# Patient Record
Sex: FEMALE | Race: BLACK OR AFRICAN AMERICAN | Employment: OTHER | URBAN - METROPOLITAN AREA
[De-identification: names, ages, dates, MRNs, and addresses within clinical notes are randomized per-mention and may not be internally consistent; named-entity substitution may affect disease eponyms.]

---

## 2018-05-04 ENCOUNTER — TELEPHONE (OUTPATIENT)
Dept: ENDOCRINOLOGY | Age: 73
End: 2018-05-04

## 2021-04-02 ENCOUNTER — OFFICE VISIT (OUTPATIENT)
Dept: PRIMARY CARE CLINIC | Age: 76
End: 2021-04-02
Payer: MEDICARE

## 2021-04-02 DIAGNOSIS — Z20.828 EXPOSURE TO SARS-ASSOCIATED CORONAVIRUS: Primary | ICD-10-CM

## 2021-04-02 LAB — SARS-COV-2: NOT DETECTED

## 2021-04-02 PROCEDURE — 99211 OFF/OP EST MAY X REQ PHY/QHP: CPT | Performed by: NURSE PRACTITIONER

## 2021-04-02 PROCEDURE — G8421 BMI NOT CALCULATED: HCPCS | Performed by: NURSE PRACTITIONER

## 2021-04-02 PROCEDURE — G8428 CUR MEDS NOT DOCUMENT: HCPCS | Performed by: NURSE PRACTITIONER

## 2021-04-02 RX ORDER — ASPIRIN 81 MG/1
81 TABLET ORAL DAILY
COMMUNITY

## 2021-04-02 RX ORDER — MAGNESIUM GLUCONATE 27 MG(500)
500 TABLET ORAL DAILY
COMMUNITY

## 2021-04-02 RX ORDER — GLIMEPIRIDE 2 MG/1
2 TABLET ORAL
COMMUNITY

## 2021-04-02 RX ORDER — ATORVASTATIN CALCIUM 20 MG/1
20 TABLET, FILM COATED ORAL DAILY
COMMUNITY

## 2021-04-02 RX ORDER — M-VIT,TX,IRON,MINS/CALC/FOLIC 27MG-0.4MG
1 TABLET ORAL DAILY
COMMUNITY

## 2021-04-02 RX ORDER — CHLORAL HYDRATE 500 MG
1000 CAPSULE ORAL DAILY
COMMUNITY

## 2021-04-02 RX ORDER — DIAZEPAM 5 MG/1
5 TABLET ORAL EVERY 6 HOURS PRN
COMMUNITY

## 2021-04-02 RX ORDER — LEVOTHYROXINE SODIUM 0.12 MG/1
125 TABLET ORAL DAILY
COMMUNITY

## 2021-04-02 RX ORDER — GLIMEPIRIDE 1 MG/1
1 TABLET ORAL NIGHTLY
COMMUNITY

## 2021-04-02 RX ORDER — PYRIDOXINE HCL (VITAMIN B6) 100 MG
50 TABLET ORAL DAILY
COMMUNITY

## 2021-04-02 NOTE — PATIENT INSTRUCTIONS

## 2021-04-02 NOTE — PROGRESS NOTES
Jaz Marino received a viral test for COVID-19. They were educated on isolation and quarantine as appropriate. For any symptoms, they were directed to seek care from their PCP, given contact information to establish with a doctor, directed to an urgent care or the emergency room.

## 2021-04-02 NOTE — PROGRESS NOTES
Name_______________________________________Printed:____________________  Date and time of surgery__4/7/21   0900______________________Arrival Time:____0730  main____________   1. The instructions given regarding when and if a patient needs to stop oral intake prior to surgery varies. Follow the specific instructions you were given                  XXXX___Nothing to eat or to drink after Midnight the night before.                   ____Carbo loading or ERAS instructions will be given to select patients-if you have been given those instructions -please do the following                           The evening before your surgery after dinner before midnight drink 40 ounces of gatorade. If you are diabetic use sugar free. The morning of surgery drink 40 ounces of water. This needs to be finished 3 hours prior to your surgery start time. 2. Take the following pills with a small sip of water on the morning of surgery________levothyroxine___________________________________________                  Do not take blood pressure medications ending in pril or sartan the tao prior to surgery or the morning of surgery_   3. Aspirin, Ibuprofen, Advil, Naproxen, Vitamin E and other Anti-inflammatory products and supplements should be stopped for 5 -7days before surgery or as directed by your physician. 4. Check with your Doctor regarding stopping Plavix, Coumadin,Eliquis, Lovenox,Effient,Pradaxa,Xarelto, Fragmin or other blood thinners and follow their instructions. 5. Do not smoke, and do not drink any alcoholic beverages 24 hours prior to surgery. This includes NA Beer. Refrain from the usage of any recreational drugs. 6. You may brush your teeth and gargle the morning of surgery. DO NOT SWALLOW WATER   7. You MUST make arrangements for a responsible adult to stay on site while you are here and take you home after your surgery. You will not be allowed to leave alone or drive yourself home.   It is strongly suggested someone stay with you the first 24 hrs. Your surgery will be cancelled if you do not have a ride home. 8. A parent/legal guardian must accompany a child scheduled for surgery and plan to stay at the hospital until the child is discharged. Please do not bring other children with you. 9. Please wear simple, loose fitting clothing to the hospital.  Vamsi Nayely not bring valuables (money, credit cards, checkbooks, etc.) Do not wear any makeup (including no eye makeup) or nail polish on your fingers or toes. 10. DO NOT wear any jewelry or piercings on day of surgery. All body piercing jewelry must be removed. 11. If you have ___dentures, they will be removed before going to the OR; we will provide you a container. If you wear ___contact lenses or ___glasses, they will be removed; please bring a case for them. 12. Please see your family doctor/pediatrician for a history & physical and/or concerning medications. Bring any test results/reports from your physician's office. PCP__________________Phone___________H&P Appt. Date________             13 If you  have a Living Will and Durable Power of  for Healthcare, please bring in a copy. 15. Notify your Surgeon if you develop any illness between now and surgery  time, cough, cold, fever, sore throat, nausea, vomiting, etc.  Please notify your surgeon if you experience dizziness, shortness of breath or blurred vision between now & the time of your surgery             15. DO NOT shave your operative site 96 hours prior to surgery. For face & neck surgery, men may use an electric razor 48 hours prior to surgery. 16. Shower the night before or morning of surgery using an antibacterial soap or as you have been instructed. 17. To provide excellent care visitors will be limited to one in the room at any given time. 18.  Please bring picture ID and insurance card.              19.  Visit our web site for additional information:  [email or web address]              20.During flu season no children under the age of 15 are permitted in the hospital for the safety of all patients. 21. If you take a long acting insulin in the evening only  take half of your usual  dose the night  before your procedure              22. If you use a c-pap please bring DOS if staying overnight,             23.For your convenience [facility] has a pharmacy on site to fill your prescriptions. 24. If you use oxygen and have a portable tank please bring it  with you the DOS             25. Bring a complete list of all your medications with name and dose include any supplements. 26. Other__________________________________________   *Please call pre admission testing if you any further questions   Thao Snyder   [address]    [place] [phone] [place]  [phone]   [facility] [address] SOWMYA OSULLIVAN   [phone]           COVID TESTING    _X_ Done  4/2/21  Where __[facility]___  __ Scheduled ___ Where ___   __ Other __________      VISITOR POLICY(subject to change)    There is a one visitor policy at [facility] for all surgeries and endoscopies. Whether the visitor can stay or will be asked to wait in the car will depend on the current policy and if social distancing can be maintained. The policy is subject to change at any time. Please make sure the visitor has a cell phone that is on,charged and able to accept calls, as this may be the way that the staff communicates with them. Pain management is NO VISITOR policyThe patients ride is expected to remain in the car with a cell phone for communication. If the ride is leaving the hospital grounds please make sure they are back in time for pickup. Have the patient inform the staff on arrival what their rides plans are while the patient is in the facility. At the MAIN there is one visitor allowed. Please note that the visitor policy is subject to change. All above information reviewed with patient in person or by phone. Patient verbalizes understanding. All questions and concerns addressed.                                                                                                  Patient/Rep____________________

## 2021-04-07 ENCOUNTER — ANESTHESIA EVENT (OUTPATIENT)
Dept: OPERATING ROOM | Age: 76
End: 2021-04-07
Payer: MEDICARE

## 2021-04-07 ENCOUNTER — ANESTHESIA (OUTPATIENT)
Dept: OPERATING ROOM | Age: 76
End: 2021-04-07
Payer: MEDICARE

## 2021-04-07 ENCOUNTER — HOSPITAL ENCOUNTER (OUTPATIENT)
Age: 76
Setting detail: OUTPATIENT SURGERY
Discharge: HOME | End: 2021-04-07
Attending: UROLOGY | Admitting: UROLOGY
Payer: MEDICARE

## 2021-04-07 ENCOUNTER — APPOINTMENT (OUTPATIENT)
Dept: GENERAL RADIOLOGY | Age: 76
End: 2021-04-07
Attending: UROLOGY
Payer: MEDICARE

## 2021-04-07 VITALS
DIASTOLIC BLOOD PRESSURE: 79 MMHG | SYSTOLIC BLOOD PRESSURE: 171 MMHG | OXYGEN SATURATION: 99 % | RESPIRATION RATE: 1 BRPM | TEMPERATURE: 98.8 F

## 2021-04-07 VITALS
HEART RATE: 74 BPM | SYSTOLIC BLOOD PRESSURE: 175 MMHG | HEIGHT: 71 IN | WEIGHT: 228 LBS | DIASTOLIC BLOOD PRESSURE: 89 MMHG | TEMPERATURE: 97 F | RESPIRATION RATE: 14 BRPM | OXYGEN SATURATION: 90 % | BODY MASS INDEX: 31.92 KG/M2

## 2021-04-07 DIAGNOSIS — N32.81 OVERACTIVE BLADDER: Primary | ICD-10-CM

## 2021-04-07 LAB
ANION GAP SERPL CALCULATED.3IONS-SCNC: 9 MMOL/L (ref 3–16)
BUN BLDV-MCNC: 23 MG/DL (ref 7–20)
CALCIUM SERPL-MCNC: 8.6 MG/DL (ref 8.3–10.6)
CHLORIDE BLD-SCNC: 101 MMOL/L (ref 99–110)
CO2: 28 MMOL/L (ref 21–32)
CREAT SERPL-MCNC: 0.9 MG/DL (ref 0.6–1.2)
GFR AFRICAN AMERICAN: >60
GFR NON-AFRICAN AMERICAN: >60
GLUCOSE BLD-MCNC: 191 MG/DL (ref 70–99)
GLUCOSE BLD-MCNC: 208 MG/DL (ref 70–99)
GLUCOSE BLD-MCNC: 216 MG/DL (ref 70–99)
HCT VFR BLD CALC: 41.1 % (ref 36–48)
HEMOGLOBIN: 13.3 G/DL (ref 12–16)
MCH RBC QN AUTO: 30 PG (ref 26–34)
MCHC RBC AUTO-ENTMCNC: 32.4 G/DL (ref 31–36)
MCV RBC AUTO: 92.6 FL (ref 80–100)
PDW BLD-RTO: 12.6 % (ref 12.4–15.4)
PERFORMED ON: ABNORMAL
PERFORMED ON: ABNORMAL
PLATELET # BLD: 151 K/UL (ref 135–450)
PMV BLD AUTO: 8.9 FL (ref 5–10.5)
POTASSIUM SERPL-SCNC: 4.7 MMOL/L (ref 3.5–5.1)
RBC # BLD: 4.44 M/UL (ref 4–5.2)
SODIUM BLD-SCNC: 138 MMOL/L (ref 136–145)
WBC # BLD: 4.7 K/UL (ref 4–11)

## 2021-04-07 PROCEDURE — 3600000002 HC SURGERY LEVEL 2 BASE: Performed by: UROLOGY

## 2021-04-07 PROCEDURE — 7100000010 HC PHASE II RECOVERY - FIRST 15 MIN: Performed by: UROLOGY

## 2021-04-07 PROCEDURE — 2500000003 HC RX 250 WO HCPCS: Performed by: UROLOGY

## 2021-04-07 PROCEDURE — 2500000003 HC RX 250 WO HCPCS: Performed by: NURSE ANESTHETIST, CERTIFIED REGISTERED

## 2021-04-07 PROCEDURE — 7100000011 HC PHASE II RECOVERY - ADDTL 15 MIN: Performed by: UROLOGY

## 2021-04-07 PROCEDURE — 2709999900 HC NON-CHARGEABLE SUPPLY: Performed by: UROLOGY

## 2021-04-07 PROCEDURE — 3700000000 HC ANESTHESIA ATTENDED CARE: Performed by: UROLOGY

## 2021-04-07 PROCEDURE — 2500000003 HC RX 250 WO HCPCS: Performed by: ANESTHESIOLOGY

## 2021-04-07 PROCEDURE — 7100000001 HC PACU RECOVERY - ADDTL 15 MIN: Performed by: UROLOGY

## 2021-04-07 PROCEDURE — 2580000003 HC RX 258: Performed by: UROLOGY

## 2021-04-07 PROCEDURE — 3600000012 HC SURGERY LEVEL 2 ADDTL 15MIN: Performed by: UROLOGY

## 2021-04-07 PROCEDURE — 80048 BASIC METABOLIC PNL TOTAL CA: CPT

## 2021-04-07 PROCEDURE — 36415 COLL VENOUS BLD VENIPUNCTURE: CPT

## 2021-04-07 PROCEDURE — 3700000001 HC ADD 15 MINUTES (ANESTHESIA): Performed by: UROLOGY

## 2021-04-07 PROCEDURE — 6360000002 HC RX W HCPCS: Performed by: UROLOGY

## 2021-04-07 PROCEDURE — 6360000002 HC RX W HCPCS: Performed by: NURSE ANESTHETIST, CERTIFIED REGISTERED

## 2021-04-07 PROCEDURE — C1778 LEAD, NEUROSTIMULATOR: HCPCS | Performed by: UROLOGY

## 2021-04-07 PROCEDURE — C1883 ADAPT/EXT, PACING/NEURO LEAD: HCPCS | Performed by: UROLOGY

## 2021-04-07 PROCEDURE — 7100000000 HC PACU RECOVERY - FIRST 15 MIN: Performed by: UROLOGY

## 2021-04-07 PROCEDURE — 85027 COMPLETE CBC AUTOMATED: CPT

## 2021-04-07 PROCEDURE — 2720000010 HC SURG SUPPLY STERILE: Performed by: UROLOGY

## 2021-04-07 DEVICE — KIT LEAD SURE SCAN INTERSTIM MRI: Type: IMPLANTABLE DEVICE | Site: BACK | Status: FUNCTIONAL

## 2021-04-07 DEVICE — EXTENSION LD PERC INTRSTM SCAN SURE MRI: Type: IMPLANTABLE DEVICE | Status: FUNCTIONAL

## 2021-04-07 RX ORDER — LIDOCAINE HYDROCHLORIDE 10 MG/ML
INJECTION, SOLUTION INFILTRATION; PERINEURAL
Status: COMPLETED | OUTPATIENT
Start: 2021-04-07 | End: 2021-04-07

## 2021-04-07 RX ORDER — HYDRALAZINE HYDROCHLORIDE 20 MG/ML
5 INJECTION INTRAMUSCULAR; INTRAVENOUS EVERY 10 MIN PRN
Status: DISCONTINUED | OUTPATIENT
Start: 2021-04-07 | End: 2021-04-07 | Stop reason: HOSPADM

## 2021-04-07 RX ORDER — CIPROFLOXACIN 2 MG/ML
400 INJECTION, SOLUTION INTRAVENOUS
Status: COMPLETED | OUTPATIENT
Start: 2021-04-07 | End: 2021-04-07

## 2021-04-07 RX ORDER — BUPIVACAINE HYDROCHLORIDE 5 MG/ML
INJECTION, SOLUTION EPIDURAL; INTRACAUDAL
Status: COMPLETED | OUTPATIENT
Start: 2021-04-07 | End: 2021-04-07

## 2021-04-07 RX ORDER — FENTANYL CITRATE 50 UG/ML
25 INJECTION, SOLUTION INTRAMUSCULAR; INTRAVENOUS EVERY 5 MIN PRN
Status: DISCONTINUED | OUTPATIENT
Start: 2021-04-07 | End: 2021-04-07 | Stop reason: HOSPADM

## 2021-04-07 RX ORDER — FENTANYL CITRATE 50 UG/ML
INJECTION, SOLUTION INTRAMUSCULAR; INTRAVENOUS PRN
Status: DISCONTINUED | OUTPATIENT
Start: 2021-04-07 | End: 2021-04-07 | Stop reason: SDUPTHER

## 2021-04-07 RX ORDER — SODIUM CHLORIDE 9 MG/ML
INJECTION, SOLUTION INTRAVENOUS CONTINUOUS
Status: DISCONTINUED | OUTPATIENT
Start: 2021-04-07 | End: 2021-04-07 | Stop reason: HOSPADM

## 2021-04-07 RX ORDER — DIPHENHYDRAMINE HYDROCHLORIDE 50 MG/ML
INJECTION INTRAMUSCULAR; INTRAVENOUS PRN
Status: DISCONTINUED | OUTPATIENT
Start: 2021-04-07 | End: 2021-04-07 | Stop reason: SDUPTHER

## 2021-04-07 RX ORDER — HYDROCODONE BITARTRATE AND ACETAMINOPHEN 5; 325 MG/1; MG/1
1 TABLET ORAL EVERY 6 HOURS PRN
Qty: 20 TABLET | Refills: 0 | Status: SHIPPED | OUTPATIENT
Start: 2021-04-07 | End: 2021-04-12

## 2021-04-07 RX ORDER — ONDANSETRON 2 MG/ML
INJECTION INTRAMUSCULAR; INTRAVENOUS PRN
Status: DISCONTINUED | OUTPATIENT
Start: 2021-04-07 | End: 2021-04-07 | Stop reason: SDUPTHER

## 2021-04-07 RX ORDER — LIDOCAINE HYDROCHLORIDE 10 MG/ML
0.5 INJECTION, SOLUTION EPIDURAL; INFILTRATION; INTRACAUDAL; PERINEURAL ONCE
Status: DISCONTINUED | OUTPATIENT
Start: 2021-04-07 | End: 2021-04-07 | Stop reason: HOSPADM

## 2021-04-07 RX ORDER — LIDOCAINE HYDROCHLORIDE 20 MG/ML
INJECTION, SOLUTION EPIDURAL; INFILTRATION; INTRACAUDAL; PERINEURAL PRN
Status: DISCONTINUED | OUTPATIENT
Start: 2021-04-07 | End: 2021-04-07 | Stop reason: SDUPTHER

## 2021-04-07 RX ORDER — KETAMINE HCL IN NACL, ISO-OSM 100MG/10ML
SYRINGE (ML) INJECTION PRN
Status: DISCONTINUED | OUTPATIENT
Start: 2021-04-07 | End: 2021-04-07 | Stop reason: SDUPTHER

## 2021-04-07 RX ORDER — ONDANSETRON 2 MG/ML
4 INJECTION INTRAMUSCULAR; INTRAVENOUS
Status: DISCONTINUED | OUTPATIENT
Start: 2021-04-07 | End: 2021-04-07 | Stop reason: HOSPADM

## 2021-04-07 RX ORDER — PROPOFOL 10 MG/ML
INJECTION, EMULSION INTRAVENOUS CONTINUOUS PRN
Status: DISCONTINUED | OUTPATIENT
Start: 2021-04-07 | End: 2021-04-07 | Stop reason: SDUPTHER

## 2021-04-07 RX ORDER — DEXAMETHASONE SODIUM PHOSPHATE 4 MG/ML
INJECTION, SOLUTION INTRA-ARTICULAR; INTRALESIONAL; INTRAMUSCULAR; INTRAVENOUS; SOFT TISSUE PRN
Status: DISCONTINUED | OUTPATIENT
Start: 2021-04-07 | End: 2021-04-07 | Stop reason: SDUPTHER

## 2021-04-07 RX ORDER — LABETALOL HYDROCHLORIDE 5 MG/ML
5 INJECTION, SOLUTION INTRAVENOUS EVERY 10 MIN PRN
Status: DISCONTINUED | OUTPATIENT
Start: 2021-04-07 | End: 2021-04-07 | Stop reason: HOSPADM

## 2021-04-07 RX ORDER — MEPERIDINE HYDROCHLORIDE 25 MG/ML
12.5 INJECTION INTRAMUSCULAR; INTRAVENOUS; SUBCUTANEOUS EVERY 5 MIN PRN
Status: DISCONTINUED | OUTPATIENT
Start: 2021-04-07 | End: 2021-04-07 | Stop reason: HOSPADM

## 2021-04-07 RX ORDER — AMOXICILLIN 250 MG
1 CAPSULE ORAL 2 TIMES DAILY
Qty: 30 TABLET | Refills: 1 | Status: SHIPPED | OUTPATIENT
Start: 2021-04-07 | End: 2021-05-07

## 2021-04-07 RX ORDER — FENTANYL CITRATE 50 UG/ML
50 INJECTION, SOLUTION INTRAMUSCULAR; INTRAVENOUS EVERY 5 MIN PRN
Status: DISCONTINUED | OUTPATIENT
Start: 2021-04-07 | End: 2021-04-07 | Stop reason: HOSPADM

## 2021-04-07 RX ADMIN — Medication 10 MG: at 11:21

## 2021-04-07 RX ADMIN — Medication 10 MG: at 11:14

## 2021-04-07 RX ADMIN — PROPOFOL 140 MCG/KG/MIN: 10 INJECTION, EMULSION INTRAVENOUS at 11:05

## 2021-04-07 RX ADMIN — DEXAMETHASONE SODIUM PHOSPHATE 10 MG: 4 INJECTION, SOLUTION INTRAMUSCULAR; INTRAVENOUS at 11:13

## 2021-04-07 RX ADMIN — DIPHENHYDRAMINE HYDROCHLORIDE 25 MG: 50 INJECTION, SOLUTION INTRAMUSCULAR; INTRAVENOUS at 11:10

## 2021-04-07 RX ADMIN — SODIUM CHLORIDE: 9 INJECTION, SOLUTION INTRAVENOUS at 09:07

## 2021-04-07 RX ADMIN — ONDANSETRON 4 MG: 2 INJECTION INTRAMUSCULAR; INTRAVENOUS at 11:21

## 2021-04-07 RX ADMIN — FENTANYL CITRATE 25 MCG: 50 INJECTION, SOLUTION INTRAMUSCULAR; INTRAVENOUS at 11:05

## 2021-04-07 RX ADMIN — Medication 10 MG: at 11:11

## 2021-04-07 RX ADMIN — LABETALOL HYDROCHLORIDE 5 MG: 5 INJECTION INTRAVENOUS at 12:33

## 2021-04-07 RX ADMIN — LIDOCAINE HYDROCHLORIDE 100 MG: 20 INJECTION, SOLUTION EPIDURAL; INFILTRATION; INTRACAUDAL; PERINEURAL at 11:05

## 2021-04-07 RX ADMIN — CIPROFLOXACIN 400 MG: 2 INJECTION, SOLUTION INTRAVENOUS at 10:53

## 2021-04-07 ASSESSMENT — PULMONARY FUNCTION TESTS
PIF_VALUE: 1
PIF_VALUE: 0
PIF_VALUE: 1
PIF_VALUE: 0
PIF_VALUE: 1
PIF_VALUE: 1

## 2021-04-07 ASSESSMENT — PAIN SCALES - GENERAL: PAINLEVEL_OUTOF10: 0

## 2021-04-07 NOTE — OP NOTE
Urology Operative Note  [facility]     Patient: Johnny Patel MRN: [id number]  Room/Bed: OR/NONE   YOB: 1945  Age/Sex: 64 y. o.female  Admission Date: 4/7/2021     Date of Operation: 4/7/2021    Preoperative Diagnosis: Overactive bladder with urgency urinary incontinence and failure of first and second line OAB treatments    Postoperative Diagnosis: same    Procedure:    1. Incision for implantation of neurostimulator electrodes, sacral nerve (transforamenal placement) (48608)  2. Fluoroscopy, up to one hour (96445-40)    Surgeon:   Yenifer Hurley MD    Anesthesia: General LMA anesthesia    Indications: Johnny Patel is a 64 y.o. female who presents for the above named surgery. Informed consent was obtained and the risks, benefits, and details of the procedure were explained to the patient who elected to proceed. The patient had previously failed at least 2 medications used to treat their diagnosis. Details of Procedure: The patient was brought to the operating room and placed in the supine position on the stretcher. SCDs were placed on the lower extremities. Following induction of anesthesia the patient was positioned prone on the OR table. The buttocks was slightly  with tape. The back was prepped and draped in the usual sterile fashion. A routine timeout was performed, confirming the patient, procedure, site, risk of fire, patient allergies and confirming that preoperative antibiotics had been administered prior to incision. The C-arm was draped and moved into AP position to provide fluoroscopic mapping of the sacral region which included marking out midline of the sacrum, SI joints, sciatic notches, medial foramenal borders and sacral foramena. Local injection of 50:50 mixture of 0.5% marcaine and 1% lidocaine was injected into the needle location sites.  A finder needle was introduced approximately 2 cm above sciatic notch and 2 cm lateral to sacral midline, feeling for foramenal margins until the S3 foramen was identified and penetrated. The depth of the Foramen Needle was confirmed and adjusted fluoroscopically. Proper needle position was confirmed by direct observation of the lifting of the perineum or bellowing, and observation of plantar flexion of the great toe utilizing the external Test Stimulator. The Foramen Needle stylet was removed and a directional guide was placed and confirmed fluoroscopically. The Foramen Needle was removed. An incision was made peripherally to the directional guide through the fascial layer. The Lead Introducer sheath with dilator was placed over the directional guide and directed into the foramen to ensure the radiopaque marker of the Lead Introducer did not extend beyond the anterior edge of the sacrum. The dilator was unlocked and removed along with the directional guide. The lead was then placed through the introducer sheath to the first white line. Position was checked fluoroscopically. The lead was then further introduced until 3 electrodes were visible below the sacrum. Each electrode was tested for visualization of angelito, and plantar flexion of the great toe. After satisfactory positioning was confirmed, the introducer sheath was retracted under continuous fluoroscopy, deploying lead tines into the perisacral tissue. Further incision was made into subcutaneous tissue posterior to the iliac crest and lateral to the sacrum. Blunt dissection was continued until the gluteal fascia was identified and hemostasis was achieved. A tunneling tool with straw was placed from the lead exit site subcutaneously to the incised pocket site. The tunneling tool was removed and the lead was fed through the straw and pulled out at the pocket site. The lead was cleansed of bodily fluids, dried and a protective boot was placed over the lead. The lead was inserted into the temporary percutaneous extension and the metal bands were aligned. The setscrew was tightened with the hex wrench. A tunnel was made subcutaneously and exited to a puncture site above the contra lateral buttock. The percutaneous extension was placed through the straw and exposed and connected to the twist lock gray cable. The wounds were irrigated with antibiotic solution in water and closed with 3-0 vicryl in two running subcuticular layers. The skin was closed with 4-0 vicryl and dermabond. The lead exit location was covered with 4x4 guaze and Tegaderm to cover the incision. Gauze was placed under twist lock cable connector and covered with Tegaderm. At the end of the procedure all needle, lap, instrument and sponge counts were correct. The patient tolerated the procedure well and was transported to the PACU in stable condition. Findings: Positive angelito and plantar flexion of great toe reflexes    Estimated Blood Loss: Minimal                 Drains: none          Specimens: none    Complications: none apparent           Disposition:  PACU - hemodynamically stable.      Plan: Assess response, follow-up for stage II Willie Fang MD  4/7/2021

## 2021-04-07 NOTE — ANESTHESIA PRE PROCEDURE
Department of Anesthesiology  Preprocedure Note       Name:  Kristina Lu   Age:  64 y.o.  :  1945                                          MRN:  [id number]         Date:  2021      Surgeon: Miller Perez):  Lenwood Felty, MD    Procedure: Procedure(s):  INTERSTIM STAGE 1    Medications prior to admission:   Prior to Admission medications    Medication Sig Start Date End Date Taking? Authorizing Provider   glimepiride (AMARYL) 2 MG tablet Take 2 mg by mouth every morning (before breakfast)   Yes Historical Provider, MD   glimepiride (AMARYL) 1 MG tablet Take 1 mg by mouth nightly   Yes Historical Provider, MD   atorvastatin (LIPITOR) 20 MG tablet Take 20 mg by mouth daily   Yes Historical Provider, MD   levothyroxine (SYNTHROID) 125 MCG tablet Take 125 mcg by mouth Daily   Yes Historical Provider, MD   diazePAM (VALIUM) 5 MG tablet Take 5 mg by mouth every 6 hours as needed for Anxiety. Yes Historical Provider, MD   aspirin 81 MG EC tablet Take 81 mg by mouth daily   Yes Historical Provider, MD   magnesium gluconate (MAGONATE) 500 MG tablet Take 500 mg by mouth daily   Yes Historical Provider, MD   pyridoxine (B-6) 100 MG tablet Take 50 mg by mouth daily   Yes Historical Provider, MD   VITAMIN D PO Take by mouth daily   Yes Historical Provider, MD   Omega-3 Fatty Acids (FISH OIL) 1000 MG CAPS Take 1,000 mg by mouth daily   Yes Historical Provider, MD   NONFORMULARY Omega 7 daily   Yes Historical Provider, MD   ELDERBERRY PO Take by mouth daily   Yes Historical Provider, MD   Multiple Vitamins-Minerals (THERAPEUTIC MULTIVITAMIN-MINERALS) tablet Take 1 tablet by mouth daily   Yes Historical Provider, MD   OXYGEN Inhale 2 L into the lungs nightly   Yes Historical Provider, MD   NONFORMULARY CALCIUM 400 MG DAILY   Yes Historical Provider, MD   PARoxetine (PAXIL) 20 MG tablet Take 20 mg by mouth every morning.      Yes Historical Provider, MD   loratadine (CLARITIN) 10 MG tablet Take 10 mg by mouth as needed Historical Provider, MD       Current medications:    No current facility-administered medications for this encounter. Allergies: Allergies   Allergen Reactions    Codeine Anaphylaxis    Dye [Iodides]      syncope       Problem List:  There is no problem list on file for this patient. Past Medical History:        Diagnosis Date    Hypertension     Hypothyroidism     Prolonged emergence from general anesthesia     Type II or unspecified type diabetes mellitus without mention of complication, not stated as uncontrolled        Past Surgical History:        Procedure Laterality Date    CATARACT REMOVAL      HYSTERECTOMY      JOINT REPLACEMENT      L.Knee    SHOULDER ARTHROPLASTY         Social History:    Social History     Tobacco Use    Smoking status: Former Smoker     Quit date: 1982     Years since quittin.7   Substance Use Topics    Alcohol use: No                                Counseling given: Not Answered      Vital Signs (Current):   Vitals:    21 1614   Weight: 235 lb (106.6 kg)   Height: 5' 11\" (1.803 m)                                              BP Readings from Last 3 Encounters:   12 142/82   12 132/82       NPO Status:                                                                                 BMI:   Wt Readings from Last 3 Encounters:   21 235 lb (106.6 kg)   12 238 lb (108 kg)   12 231 lb (104.8 kg)     Body mass index is 32.78 kg/m². CBC: No results found for: WBC, RBC, HGB, HCT, MCV, RDW, PLT    CMP: No results found for: NA, K, CL, CO2, BUN, CREATININE, GFRAA, AGRATIO, LABGLOM, GLUCOSE, PROT, CALCIUM, BILITOT, ALKPHOS, AST, ALT    POC Tests: No results for input(s): POCGLU, POCNA, POCK, POCCL, POCBUN, POCHEMO, POCHCT in the last 72 hours.     Coags: No results found for: PROTIME, INR, APTT    HCG (If Applicable): No results found for: PREGTESTUR, PREGSERUM, HCG, HCGQUANT     ABGs: No results found for: PHART, PO2ART, FWH9HIJ, VTS6JZH, BEART, Q8KBRWFT     Type & Screen (If Applicable):  No results found for: LABABO, LABRH    Drug/Infectious Status (If Applicable):  No results found for: HIV, HEPCAB    COVID-19 Screening (If Applicable):   Lab Results   Component Value Date    COVID19 Not Detected 04/02/2021           Anesthesia Evaluation  Patient summary reviewed and Nursing notes reviewed  Airway: Mallampati: III        Dental:          Pulmonary:                              Cardiovascular:  Exercise tolerance: poor (<4 METS),   (+) hypertension:, hyperlipidemia                  Neuro/Psych:               GI/Hepatic/Renal:             Endo/Other:    (+) Diabetes, hypothyroidism::., .                 Abdominal:           Vascular:                                        Anesthesia Plan      general     ASA 3                               Patsy Turner MD   4/7/2021

## 2021-04-07 NOTE — H&P
Urology Preoperative History & Physical  [facility]     Patient: Salina Avila MRN: [id number]  Room/Bed: OR/NONE   YOB: 1945  Age/Sex: 64 y. o.female  Admission Date: 4/7/2021     Date of Service:  4/7/2021    ASSESSMENT/PLAN     Overactive bladder with urgency urinary incontinence here for stage I InterStim    Plan: To OR for above procedure    All patient questions were answered. She understands the plan as listed above. HISTORY     Chief Complaint: As above    History of Present Illness: Salina Avila is a 64 y.o. female with above listed problems. No changes in history/physical since last evaluation. No change in symptoms. Past Medical History:  She has a past medical history of Hypertension, Hypothyroidism, Prolonged emergence from general anesthesia, and Type II or unspecified type diabetes mellitus without mention of complication, not stated as uncontrolled. Hospital Problem List:  Active Problems:    * No active hospital problems. *  Resolved Problems:    * No resolved hospital problems. *      Past Surgical History:  She has a past surgical history that includes Total shoulder arthroplasty; joint replacement; Cataract removal; and Hysterectomy. Social History:  She reports that she quit smoking about 38 years ago. She has never used smokeless tobacco. She reports that she does not drink alcohol. Family History:  family history includes Arthritis in her sister; Heart Attack in her mother; Stroke in her father. Allergies: Allergies   Allergen Reactions    Codeine Anaphylaxis    Dye [Iodides]      syncope       Medications:  Scheduled Meds:   lidocaine PF  0.5 mL Intradermal Once    ciprofloxacin  400 mg Intravenous On Call to OR     Continuous Infusions:   sodium chloride       PRN Meds:meperidine, ondansetron, labetalol, hydrALAZINE, fentanNYL, fentanNYL, fentanNYL    Review of Systems:  Pertinent positives/negatives reviewed in HPI.   All other systems reviewed and negative, unless noted below. Constitutional: Negative  Genitourinary: see HPI  HEENT: Negative   Cardiovascular: Negative   Respiratory: Negative   Gastrointestinal: Negative   Musculoskeletal: Negative   Neurological: Negative   Psychiatric: Negative   Integumentary: Negative     PHYSICAL EXAM     Vitals:    04/07/21 0827   BP: (!) 178/91   Pulse: 78   Resp: 13   Temp: 98.1 °F (36.7 °C)   SpO2: 93%     CONSTITUTIONAL: The patient is well nourished/developed, with no distress noted. CARDIOVASCULAR: normal rate. RESPIRATOR: non-labored breathing. GENITOURINARY: Defer to OR; see clinic note for  exam.    Ins/Outs:  No intake or output data in the 24 hours ending 04/07/21 0853    LABS     CBC   Lab Results   Component Value Date    WBC 4.7 04/07/2021    RBC 4.44 04/07/2021    HGB 13.3 04/07/2021    HCT 41.1 04/07/2021    MCV 92.6 04/07/2021    MCH 30.0 04/07/2021    MCHC 32.4 04/07/2021    RDW 12.6 04/07/2021     04/07/2021    MPV 8.9 04/07/2021     BMP   Lab Results   Component Value Date     04/07/2021    K 4.7 04/07/2021     04/07/2021    CO2 28 04/07/2021    BUN 23 04/07/2021    CREATININE 0.9 04/07/2021    GLUCOSE 216 04/07/2021    CALCIUM 8.6 04/07/2021     Urinalysis: No results found for: COLORU, GLUCOSEU, BLOODU, NITRU, LEUKOCYTESUR  Urine culture: No results for input(s): Brito Donning in the last 72 hours. PSA: No results found for: PSA      IMAGING     No results found.          Electronically signed by: Yenifer Hurley MD, MAS[place] 4/7/2021   [facility]  Office Contact: [phone]

## 2021-04-07 NOTE — ANESTHESIA POSTPROCEDURE EVALUATION
Department of Anesthesiology  Postprocedure Note    Patient: Luigi Castro  MRN: [id number]  YOB: 1945  Date of evaluation: 4/7/2021  Time:  12:35 PM     Procedure Summary     Date: 04/07/21 Room / Location: [address] [place] [address]    Anesthesia Start: 1056 Anesthesia Stop: 7285    Procedure: INTERSTIM STAGE 1 (N/A Back) Diagnosis: (N39.41 Talya Jean)    Surgeons: Concepcion Guzman MD Responsible Provider: Antonio Welch MD    Anesthesia Type: general ASA Status: 3          Anesthesia Type: general    Nannette Phase I: Nannette Score: 9    Nannette Phase II:      Last vitals: Reviewed and per EMR flowsheets.        Anesthesia Post Evaluation    Patient location during evaluation: PACU  Patient participation: complete - patient participated  Level of consciousness: awake and alert  Airway patency: patent  Nausea & Vomiting: no vomiting and no nausea  Complications: no  Cardiovascular status: hemodynamically stable  Respiratory status: acceptable  Hydration status: stable

## 2021-04-07 NOTE — PROGRESS NOTES
Discharge instructions given with scripts discharged in wheelchair in stable condition with daughter purse and belongings returned

## 2021-04-07 NOTE — PROGRESS NOTES
Patient alert, VSS and O2 sat maintained on Ra. Denies pain. Patient voided again per bedpan without difficulty. Left back dressing CDI. Patient meets all phase 1 discharge criteria, seen by anesthesia. Will transfer to phase 2 care.

## 2021-04-20 NOTE — PROGRESS NOTES
Patient not reached. Preop instructions left on voice mail. Number_______________    -Date__4/21/21_____time__0730_____arrival____0600 main________  -Nothing to eat or drink after midnight  -Responsible adult 25 or older to stay on site while you are here and drive you home and stay with you after  -Follow any instructions your doctors office has given you  -Bring a complete list of all your medications and supplements  -If you normally take the following medications in the morning please do so with a small    sip of water-heart,blood pressure,seizure,breathing or thyroid-avoid water pilll Do not take blood pressure medications ending in \"rose\" or \"pril\" the AM of surgery or the tao prior  -You may use your inhalers  -Take half of your normal dose of any long acting insulins the night before-do not take    any diabetic medications in the morning  -Follow your doctors instructions regarding blood thinners  -Any questions call your surgeons office      Jose Mancilla    __ Done ___ Where _____  __ Scheduled ___ Where ____  _x_ Other ______rapid________________      VISITOR POLICY(subject to change)    There is a one visitor policy at [facility] for all surgeries and endoscopies. Whether the visitor can stay or will be asked to wait in the car will depend on the current policy and if social distancing can be maintained. The policy is subject to change at any time. Please make sure the visitor has a cell phone that is on,charged and able to accept calls, as this may be the way that the staff communicates with them. Pain management is NO VISITOR policyThe patients ride is expected to remain in the car with a cell phone for communication. If the ride is leaving the hospital grounds please make sure they are back in time for pickup. Have the patient inform the staff on arrival what their rides plans are while the patient is in the facility. At the [facility] there is one visitor allowed. Please note that the visitor policy is subject to change.

## 2021-04-21 ENCOUNTER — ANESTHESIA EVENT (OUTPATIENT)
Dept: OPERATING ROOM | Age: 76
End: 2021-04-21
Payer: MEDICARE

## 2021-04-21 ENCOUNTER — ANESTHESIA (OUTPATIENT)
Dept: OPERATING ROOM | Age: 76
End: 2021-04-21
Payer: MEDICARE

## 2021-04-21 ENCOUNTER — HOSPITAL ENCOUNTER (OUTPATIENT)
Age: 76
Setting detail: OUTPATIENT SURGERY
Discharge: HOME | End: 2021-04-21
Attending: UROLOGY | Admitting: UROLOGY
Payer: MEDICARE

## 2021-04-21 VITALS
DIASTOLIC BLOOD PRESSURE: 86 MMHG | WEIGHT: 235 LBS | TEMPERATURE: 97.6 F | HEART RATE: 77 BPM | RESPIRATION RATE: 14 BRPM | OXYGEN SATURATION: 95 % | SYSTOLIC BLOOD PRESSURE: 158 MMHG | BODY MASS INDEX: 32.9 KG/M2 | HEIGHT: 71 IN

## 2021-04-21 VITALS — DIASTOLIC BLOOD PRESSURE: 62 MMHG | TEMPERATURE: 96.8 F | SYSTOLIC BLOOD PRESSURE: 121 MMHG | OXYGEN SATURATION: 98 %

## 2021-04-21 LAB
GLUCOSE BLD-MCNC: 117 MG/DL (ref 70–99)
GLUCOSE BLD-MCNC: 183 MG/DL (ref 70–99)
PERFORMED ON: ABNORMAL
PERFORMED ON: ABNORMAL
SARS-COV-2, NAAT: NOT DETECTED

## 2021-04-21 PROCEDURE — 7100000001 HC PACU RECOVERY - ADDTL 15 MIN: Performed by: UROLOGY

## 2021-04-21 PROCEDURE — 7100000010 HC PHASE II RECOVERY - FIRST 15 MIN: Performed by: UROLOGY

## 2021-04-21 PROCEDURE — 2580000003 HC RX 258: Performed by: ANESTHESIOLOGY

## 2021-04-21 PROCEDURE — 3700000000 HC ANESTHESIA ATTENDED CARE: Performed by: UROLOGY

## 2021-04-21 PROCEDURE — 3600000002 HC SURGERY LEVEL 2 BASE: Performed by: UROLOGY

## 2021-04-21 PROCEDURE — 7100000000 HC PACU RECOVERY - FIRST 15 MIN: Performed by: UROLOGY

## 2021-04-21 PROCEDURE — 2720000010 HC SURG SUPPLY STERILE: Performed by: UROLOGY

## 2021-04-21 PROCEDURE — 6360000002 HC RX W HCPCS: Performed by: ANESTHESIOLOGY

## 2021-04-21 PROCEDURE — 6370000000 HC RX 637 (ALT 250 FOR IP): Performed by: ANESTHESIOLOGY

## 2021-04-21 PROCEDURE — 2500000003 HC RX 250 WO HCPCS: Performed by: NURSE ANESTHETIST, CERTIFIED REGISTERED

## 2021-04-21 PROCEDURE — 6360000002 HC RX W HCPCS: Performed by: UROLOGY

## 2021-04-21 PROCEDURE — 2500000003 HC RX 250 WO HCPCS

## 2021-04-21 PROCEDURE — 2500000003 HC RX 250 WO HCPCS: Performed by: UROLOGY

## 2021-04-21 PROCEDURE — 7100000011 HC PHASE II RECOVERY - ADDTL 15 MIN: Performed by: UROLOGY

## 2021-04-21 PROCEDURE — C1767 GENERATOR, NEURO NON-RECHARG: HCPCS | Performed by: UROLOGY

## 2021-04-21 PROCEDURE — 87635 SARS-COV-2 COVID-19 AMP PRB: CPT

## 2021-04-21 PROCEDURE — 6360000002 HC RX W HCPCS: Performed by: NURSE ANESTHETIST, CERTIFIED REGISTERED

## 2021-04-21 PROCEDURE — 6360000002 HC RX W HCPCS

## 2021-04-21 PROCEDURE — 3600000012 HC SURGERY LEVEL 2 ADDTL 15MIN: Performed by: UROLOGY

## 2021-04-21 PROCEDURE — C1787 PATIENT PROGR, NEUROSTIM: HCPCS | Performed by: UROLOGY

## 2021-04-21 PROCEDURE — 2709999900 HC NON-CHARGEABLE SUPPLY: Performed by: UROLOGY

## 2021-04-21 PROCEDURE — 3700000001 HC ADD 15 MINUTES (ANESTHESIA): Performed by: UROLOGY

## 2021-04-21 PROCEDURE — 2580000003 HC RX 258: Performed by: UROLOGY

## 2021-04-21 DEVICE — Z DUP USE 2628873 GENERATOR NEUROSTIMULATOR H1.7XL2IN THK3IN TORQ WRNCH PROD: Type: IMPLANTABLE DEVICE | Site: BACK | Status: FUNCTIONAL

## 2021-04-21 RX ORDER — MAGNESIUM HYDROXIDE 1200 MG/15ML
LIQUID ORAL
Status: COMPLETED | OUTPATIENT
Start: 2021-04-21 | End: 2021-04-21

## 2021-04-21 RX ORDER — LIDOCAINE HYDROCHLORIDE 20 MG/ML
INJECTION, SOLUTION EPIDURAL; INFILTRATION; INTRACAUDAL; PERINEURAL PRN
Status: DISCONTINUED | OUTPATIENT
Start: 2021-04-21 | End: 2021-04-21 | Stop reason: SDUPTHER

## 2021-04-21 RX ORDER — DEXAMETHASONE SODIUM PHOSPHATE 4 MG/ML
INJECTION, SOLUTION INTRA-ARTICULAR; INTRALESIONAL; INTRAMUSCULAR; INTRAVENOUS; SOFT TISSUE PRN
Status: DISCONTINUED | OUTPATIENT
Start: 2021-04-21 | End: 2021-04-21 | Stop reason: SDUPTHER

## 2021-04-21 RX ORDER — LABETALOL HYDROCHLORIDE 5 MG/ML
INJECTION, SOLUTION INTRAVENOUS
Status: COMPLETED
Start: 2021-04-21 | End: 2021-04-21

## 2021-04-21 RX ORDER — CIPROFLOXACIN 2 MG/ML
400 INJECTION, SOLUTION INTRAVENOUS ONCE
Status: COMPLETED | OUTPATIENT
Start: 2021-04-21 | End: 2021-04-21

## 2021-04-21 RX ORDER — BUPIVACAINE HYDROCHLORIDE 5 MG/ML
INJECTION, SOLUTION EPIDURAL; INTRACAUDAL
Status: COMPLETED | OUTPATIENT
Start: 2021-04-21 | End: 2021-04-21

## 2021-04-21 RX ORDER — DIAZEPAM 5 MG/1
5 TABLET ORAL ONCE
Status: COMPLETED | OUTPATIENT
Start: 2021-04-21 | End: 2021-04-21

## 2021-04-21 RX ORDER — ONDANSETRON 2 MG/ML
INJECTION INTRAMUSCULAR; INTRAVENOUS PRN
Status: DISCONTINUED | OUTPATIENT
Start: 2021-04-21 | End: 2021-04-21 | Stop reason: SDUPTHER

## 2021-04-21 RX ORDER — FENTANYL CITRATE 50 UG/ML
25 INJECTION, SOLUTION INTRAMUSCULAR; INTRAVENOUS EVERY 5 MIN PRN
Status: DISCONTINUED | OUTPATIENT
Start: 2021-04-21 | End: 2021-04-21 | Stop reason: HOSPADM

## 2021-04-21 RX ORDER — FENTANYL CITRATE 50 UG/ML
INJECTION, SOLUTION INTRAMUSCULAR; INTRAVENOUS
Status: COMPLETED
Start: 2021-04-21 | End: 2021-04-21

## 2021-04-21 RX ORDER — LIDOCAINE HYDROCHLORIDE 10 MG/ML
INJECTION, SOLUTION INFILTRATION; PERINEURAL
Status: COMPLETED | OUTPATIENT
Start: 2021-04-21 | End: 2021-04-21

## 2021-04-21 RX ORDER — SODIUM CHLORIDE 9 MG/ML
INJECTION, SOLUTION INTRAVENOUS CONTINUOUS
Status: DISCONTINUED | OUTPATIENT
Start: 2021-04-21 | End: 2021-04-21 | Stop reason: HOSPADM

## 2021-04-21 RX ORDER — PROPOFOL 10 MG/ML
INJECTION, EMULSION INTRAVENOUS PRN
Status: DISCONTINUED | OUTPATIENT
Start: 2021-04-21 | End: 2021-04-21 | Stop reason: SDUPTHER

## 2021-04-21 RX ORDER — LIDOCAINE HYDROCHLORIDE 10 MG/ML
1 INJECTION, SOLUTION EPIDURAL; INFILTRATION; INTRACAUDAL; PERINEURAL
Status: DISCONTINUED | OUTPATIENT
Start: 2021-04-21 | End: 2021-04-21 | Stop reason: HOSPADM

## 2021-04-21 RX ORDER — FENTANYL CITRATE 50 UG/ML
INJECTION, SOLUTION INTRAMUSCULAR; INTRAVENOUS PRN
Status: DISCONTINUED | OUTPATIENT
Start: 2021-04-21 | End: 2021-04-21 | Stop reason: SDUPTHER

## 2021-04-21 RX ORDER — KETAMINE HCL IN NACL, ISO-OSM 100MG/10ML
SYRINGE (ML) INJECTION PRN
Status: DISCONTINUED | OUTPATIENT
Start: 2021-04-21 | End: 2021-04-21 | Stop reason: SDUPTHER

## 2021-04-21 RX ORDER — LABETALOL HYDROCHLORIDE 5 MG/ML
5 INJECTION, SOLUTION INTRAVENOUS ONCE
Status: COMPLETED | OUTPATIENT
Start: 2021-04-21 | End: 2021-04-21

## 2021-04-21 RX ADMIN — LABETALOL HYDROCHLORIDE 5 MG: 5 INJECTION INTRAVENOUS at 09:26

## 2021-04-21 RX ADMIN — DEXAMETHASONE SODIUM PHOSPHATE 10 MG: 4 INJECTION, SOLUTION INTRAMUSCULAR; INTRAVENOUS at 07:46

## 2021-04-21 RX ADMIN — DIAZEPAM 5 MG: 5 TABLET ORAL at 09:00

## 2021-04-21 RX ADMIN — Medication 10 MG: at 07:48

## 2021-04-21 RX ADMIN — PROPOFOL 40 MG: 10 INJECTION, EMULSION INTRAVENOUS at 07:44

## 2021-04-21 RX ADMIN — FENTANYL CITRATE 25 MCG: 50 INJECTION, SOLUTION INTRAMUSCULAR; INTRAVENOUS at 08:35

## 2021-04-21 RX ADMIN — PROPOFOL 40 MG: 10 INJECTION, EMULSION INTRAVENOUS at 07:41

## 2021-04-21 RX ADMIN — PROPOFOL 40 MG: 10 INJECTION, EMULSION INTRAVENOUS at 07:51

## 2021-04-21 RX ADMIN — FENTANYL CITRATE 25 MCG: 50 INJECTION, SOLUTION INTRAMUSCULAR; INTRAVENOUS at 07:35

## 2021-04-21 RX ADMIN — FENTANYL CITRATE 25 MCG: 50 INJECTION, SOLUTION INTRAMUSCULAR; INTRAVENOUS at 08:00

## 2021-04-21 RX ADMIN — LIDOCAINE HYDROCHLORIDE 100 MG: 20 INJECTION, SOLUTION EPIDURAL; INFILTRATION; INTRACAUDAL; PERINEURAL at 07:38

## 2021-04-21 RX ADMIN — ONDANSETRON 4 MG: 2 INJECTION INTRAMUSCULAR; INTRAVENOUS at 07:46

## 2021-04-21 RX ADMIN — CIPROFLOXACIN 400 MG: 2 INJECTION, SOLUTION INTRAVENOUS at 07:29

## 2021-04-21 RX ADMIN — Medication 10 MG: at 07:41

## 2021-04-21 RX ADMIN — PROPOFOL 40 MG: 10 INJECTION, EMULSION INTRAVENOUS at 07:47

## 2021-04-21 RX ADMIN — FENTANYL CITRATE 25 MCG: 50 INJECTION, SOLUTION INTRAMUSCULAR; INTRAVENOUS at 08:23

## 2021-04-21 RX ADMIN — PROPOFOL 40 MG: 10 INJECTION, EMULSION INTRAVENOUS at 07:58

## 2021-04-21 RX ADMIN — LABETALOL HYDROCHLORIDE 5 MG: 5 INJECTION, SOLUTION INTRAVENOUS at 09:26

## 2021-04-21 RX ADMIN — SODIUM CHLORIDE: 9 INJECTION, SOLUTION INTRAVENOUS at 06:49

## 2021-04-21 RX ADMIN — FENTANYL CITRATE 25 MCG: 50 INJECTION, SOLUTION INTRAMUSCULAR; INTRAVENOUS at 07:53

## 2021-04-21 RX ADMIN — FENTANYL CITRATE 25 MCG: 50 INJECTION, SOLUTION INTRAMUSCULAR; INTRAVENOUS at 07:31

## 2021-04-21 RX ADMIN — Medication 10 MG: at 07:46

## 2021-04-21 ASSESSMENT — PULMONARY FUNCTION TESTS
PIF_VALUE: 1
PIF_VALUE: 0
PIF_VALUE: 1
PIF_VALUE: 0
PIF_VALUE: 1
PIF_VALUE: 0
PIF_VALUE: 1

## 2021-04-21 NOTE — PROGRESS NOTES
Anesthesia to bedside. Patient ambulated to restroom to void 2x. PO valium received. Patient states her BP will improve with valium. 5mg labetalol ordered IV. Patient called for another ride d/t daughter being evaluated in ER. Will continue to monitor.

## 2021-04-21 NOTE — OP NOTE
Urology Operative Report  [facility]    Provider: Juan Carlos May MD Patient ID:  Admission Date: 2021 Name: Charlie Justice  OR Date: 2021  MRN: [id number]   Patient Location: OR/NONE : 1945  Attending: Juan Carlos May MD Date of Service: 2021  PCP: Sarah Gamble MD     Preoperative Diagnosis: Overactive bladder with urgency urinary incontinence and failure of first and second line OAB treatments    Postoperative Diagnosis: same    Procedure:    1. Insertion of peripheral neurostimulator pulse generator, direct or inductive coupling (22672)  2. Electronic analysis of implanted neurostimulator pulse generator system (e.g., rate, pulse amplitude, pulse duration, configuration of wave form, battery status, electrode selectability, output modulation, cycling, impedance, and patient compliance measurements); peripheral (e.g., peripheral nerve, sacral nerve, and neuromuscular) neurostimulator pulse generator/transmitter, with intraoperative or subsequent programming (69595)    Surgeon:   Juan Carlos May MD    Anesthesia: Monitored Local Anesthesia with Sedation    Indications: Charlie Justice is a 64 y.o. female who presents for the above named surgery. Informed consent was obtained and the risks, benefits, and details of the procedure were explained to the patient who elected to proceed. The patient had previously failed at least 2 medications used to treat their diagnosis    Details of Procedure: The patient was brought to the operating room and placed in the supine position on the stretcher. SCDs were placed on the lower extremities. Following induction of anesthesia the patient was positioned prone on the OR table. The buttocks was slightly  with tape. The back was prepped and draped in the usual sterile fashion.  A routine timeout was performed, confirming the patient, procedure, site, risk of fire, patient allergies and confirming that preoperative antibiotics had been administered prior to incision. The previous pocket was re-opened using a scalpel and then blunt dissection. The previously placed lead was identified and pulled into the surgical field. The two sutures were cut, and the boot was removed. The lead was detatched by releasing the set screw. The external wire was correctly identified and cut, allowing removal of the external wire element. The pocket was developed and hemostasis was ensured. The pocket was thoroughly irrigated with antibiotic solution. The lead was cleansed of bodily fluids and dried. The lead was inserted into the InterStim Neurostimulator and the metal bands were aligned with the blue lead tip clearly visible in the distal portion of the neurostimulator header. The single setscrew was tightened with the hex wrench. The neurostimulator was placed into the subcutaneous pocket with the etched identification side placed upwards and the excessive lead wrapped counterclockwise around the neurostimulator. The programming head was placed over the implanted neurostimulator in a sterile cover to ensure adequate lead connection and that parameters were within normal limits. Impedances were confirmed to be within normal limits, greater than 50 and less than 4,000. The wounds were irrigated with antibiotic solution in water and closed with 3-0 vicryl in two running subcuticular layers. The skin was closed with 4-0 vicryl and dermabond. At the end of the procedure all needle, lap, instrument and sponge counts were correct. The patient tolerated the procedure well and was transported to the PACU in stable condition. Findings: Normal impedances no evidence of infection    Estimated Blood Loss: Minimal                 Drains: none          Specimens: none    Complications: none apparent           Disposition:  PACU - hemodynamically stable.      Plan: Continue following urinary symptoms and InterStim postoperative pathway and follow-up for programming in the office           Alva Spencer MD  4/21/2021

## 2021-04-21 NOTE — ANESTHESIA PRE PROCEDURE
Department of Anesthesiology  Preprocedure Note       Name:  Jean Pierre Chavez   Age:  64 y.o.  :  1945                                          MRN:  [id number]         Date:  2021      Surgeon: Ahmet Borjas):  Fidel Brothers MD    Procedure: Procedure(s):  INTERSTIM STAGE 2 (BIPOLAR)    Medications prior to admission:   Prior to Admission medications    Medication Sig Start Date End Date Taking? Authorizing Provider   glimepiride (AMARYL) 2 MG tablet Take 2 mg by mouth every morning (before breakfast)   Yes Historical Provider, MD   glimepiride (AMARYL) 1 MG tablet Take 1 mg by mouth nightly   Yes Historical Provider, MD   atorvastatin (LIPITOR) 20 MG tablet Take 20 mg by mouth daily   Yes Historical Provider, MD   levothyroxine (SYNTHROID) 125 MCG tablet Take 125 mcg by mouth Daily   Yes Historical Provider, MD   Multiple Vitamins-Minerals (THERAPEUTIC MULTIVITAMIN-MINERALS) tablet Take 1 tablet by mouth daily   Yes Historical Provider, MD   PARoxetine (PAXIL) 20 MG tablet Take 20 mg by mouth every morning. Yes Historical Provider, MD   loratadine (CLARITIN) 10 MG tablet Take 10 mg by mouth as needed    Yes Historical Provider, MD   senna-docusate (PERICOLACE) 8.6-50 MG per tablet Take 1 tablet by mouth 2 times daily For constipation while on pain medication. 21  Fidel Brothers MD   diazePAM (VALIUM) 5 MG tablet Take 5 mg by mouth every 6 hours as needed for Anxiety.     Historical Provider, MD   aspirin 81 MG EC tablet Take 81 mg by mouth daily    Historical Provider, MD   magnesium gluconate (MAGONATE) 500 MG tablet Take 500 mg by mouth daily    Historical Provider, MD   pyridoxine (B-6) 100 MG tablet Take 50 mg by mouth daily    Historical Provider, MD   VITAMIN D PO Take by mouth daily    Historical Provider, MD   Omega-3 Fatty Acids (FISH OIL) 1000 MG CAPS Take 1,000 mg by mouth daily    Historical Provider, MD   NONFORMULARY Omega 7 daily    Historical Provider, MD FAYE PO (!) 171/79       NPO Status:                                                                                 BMI:   Wt Readings from Last 3 Encounters:   04/21/21 235 lb (106.6 kg)   04/07/21 228 lb (103.4 kg)   09/24/12 238 lb (108 kg)     Body mass index is 32.78 kg/m². CBC:   Lab Results   Component Value Date    WBC 4.7 04/07/2021    RBC 4.44 04/07/2021    HGB 13.3 04/07/2021    HCT 41.1 04/07/2021    MCV 92.6 04/07/2021    RDW 12.6 04/07/2021     04/07/2021       CMP:   Lab Results   Component Value Date     04/07/2021    K 4.7 04/07/2021     04/07/2021    CO2 28 04/07/2021    BUN 23 04/07/2021    CREATININE 0.9 04/07/2021    GFRAA >60 04/07/2021    LABGLOM >60 04/07/2021    GLUCOSE 216 04/07/2021    CALCIUM 8.6 04/07/2021       POC Tests: No results for input(s): POCGLU, POCNA, POCK, POCCL, POCBUN, POCHEMO, POCHCT in the last 72 hours. Coags: No results found for: PROTIME, INR, APTT    HCG (If Applicable): No results found for: PREGTESTUR, PREGSERUM, HCG, HCGQUANT     ABGs: No results found for: PHART, PO2ART, WTM0JOV, BUV9ATR, BEART, D8BVEFBG     Type & Screen (If Applicable):  No results found for: LABABO, LABRH    Drug/Infectious Status (If Applicable):  No results found for: HIV, HEPCAB    COVID-19 Screening (If Applicable):   Lab Results   Component Value Date    COVID19 Not Detected 04/21/2021    COVID19 Not Detected 04/02/2021           Anesthesia Evaluation  Patient summary reviewed history of anesthetic complications (prolonged emergence): Airway: Mallampati: II  TM distance: >3 FB   Neck ROM: full  Mouth opening: > = 3 FB Dental: normal exam         Pulmonary:       (-) rhonchi and wheezes                          ROS comment: Oxygen 2L nc at night  Former smoker   Cardiovascular:  Exercise tolerance: poor (<4 METS),   (+) hypertension:,     (-) CABG/stent, dysrhythmias and  angina      Rhythm: regular  Rate: normal                 ROS comment: SUMMARY:    1.  Left ventricle: The cavity size was normal. Wall thickness was     normal. Systolic function was at the lower limits of normal. The     estimated ejection fraction was in the range of 50% to 55%.   Although no diagnostic regional wall motion abnormality was     identified, this possibility cannot be completely excluded on     the basis of this study. The tissue Doppler parameters were     abnormal. Doppler parameters are consistent with abnormal left     ventricular relaxation (grade 1 diastolic dysfunction). Cannot     exclude thrombus. 2. Right ventricle: The cavity size was normal. Wall thickness was     normal. Systolic function was normal.    Impressions:  No prior study was available for comparison. Neuro/Psych:      (-) seizures, TIA and CVA           GI/Hepatic/Renal:        (-) GERD       Endo/Other:    (+) Diabetes, hypothyroidism::., .                  ROS comment: Anaphylaxis to codeine Abdominal:           Vascular:                                        Anesthesia Plan      general     ASA 3       Induction: intravenous. Anesthetic plan and risks discussed with patient. Use of blood products discussed with patient whom consented to blood products. Plan discussed with CRNA.                   El Beltre MD   4/21/2021

## 2021-04-21 NOTE — ANESTHESIA POSTPROCEDURE EVALUATION
Department of Anesthesiology  Postprocedure Note    Patient: Charlie Justice  MRN: [id number]  YOB: 1945  Date of evaluation: 4/21/2021  Time:  9:53 AM     Procedure Summary     Date: 04/21/21 Room / Location: [address] [place] [address]    Anesthesia Start: 5368 Anesthesia Stop: 4944    Procedure: INTERSTIM STAGE 2 (BIPOLAR) (N/A ) Diagnosis: (N39.41 Leena Ann)    Surgeons: Juan Carlos May MD Responsible Provider: Sam Doty MD    Anesthesia Type: general ASA Status: 3          Anesthesia Type: general    Nannette Phase I: Nannette Score: 10    Nannette Phase II: Nannette Score: 10    Last vitals: Reviewed and per EMR flowsheets.        Anesthesia Post Evaluation    Patient location during evaluation: PACU  Patient participation: complete - patient participated  Level of consciousness: awake  Airway patency: patent  Nausea & Vomiting: no vomiting  Complications: no  Cardiovascular status: hemodynamically stable  Respiratory status: acceptable  Hydration status: euvolemic

## 2021-04-21 NOTE — PROGRESS NOTES
Discharge instructions completed with patient. Ride arrived. IV removed.  Patient wheeled to car per PCA

## 2022-02-28 NOTE — PROGRESS NOTES
Name_______________________________________Printed:____________________  Date and time of surgery___03/02/22___0845__________________Arrival Time:__0715____MAIN__________   1. The instructions given regarding when and if a patient needs to stop oral intake prior to surgery varies. Follow the specific instructions you were given                  _X__Nothing to eat or to drink after Midnight the night before.                   ____Carbo loading or ERAS instructions will be given to select patients-if you have been given those instructions -please do the following                           The evening before your surgery after dinner before midnight drink 40 ounces of gatorade. If you are diabetic use sugar free. The morning of surgery drink 40 ounces of water. This needs to be finished 3 hours prior to your surgery start time. 2. Take the following pills with a small sip of water on the morning of surgery___Sythroid________________________________________________                  Do not take blood pressure medications ending in pril or sartan the tao prior to surgery or the morning of surgery_   3. Aspirin, Ibuprofen, Advil, Naproxen, Vitamin E and other Anti-inflammatory products and supplements should be stopped for 5 -7days before surgery or as directed by your physician. 4. Check with your Doctor regarding stopping Plavix, Coumadin,Eliquis, Lovenox,Effient,Pradaxa,Xarelto, Fragmin or other blood thinners and follow their instructions. 5. Do not smoke, and do not drink any alcoholic beverages 24 hours prior to surgery. This includes NA Beer. Refrain from the usage of any recreational drugs. 6. You may brush your teeth and gargle the morning of surgery. DO NOT SWALLOW WATER   7. You MUST make arrangements for a responsible adult to stay on site while you are here and take you home after your surgery. You will not be allowed to leave alone or drive yourself home.   It is strongly suggested someone stay with you the first 24 hrs. Your surgery will be cancelled if you do not have a ride home. 8. A parent/legal guardian must accompany a child scheduled for surgery and plan to stay at the hospital until the child is discharged. Please do not bring other children with you. 9. Please wear simple, loose fitting clothing to the hospital.  [place] not bring valuables (money, credit cards, checkbooks, etc.) Do not wear any makeup (including no eye makeup) or nail polish on your fingers or toes. 10. DO NOT wear any jewelry or piercings on day of surgery. All body piercing jewelry must be removed. 11. If you have ___dentures, they will be removed before going to the OR; we will provide you a container. If you wear ___contact lenses or _X__glasses, they will be removed; please bring a case for them. 12. Please see your family doctor/pediatrician for a history & physical and/or concerning medications. Bring any test results/reports from your physician's office. PCP__________________Phone___________H&P Appt. Date________             13 If you  have a Living Will and Durable Power of  for Healthcare, please bring in a copy. 15. Notify your Surgeon if you develop any illness between now and surgery  time, cough, cold, fever, sore throat, nausea, vomiting, etc.  Please notify your surgeon if you experience dizziness, shortness of breath or blurred vision between now & the time of your surgery             15. DO NOT shave your operative site 96 hours prior to surgery. For face & neck surgery, men may use an electric razor 48 hours prior to surgery. 16. Shower the night before or morning of surgery using an antibacterial soap or as you have been instructed. 17. To provide excellent care visitors will be limited to one in the room at any given time. 18.  Please bring picture ID and insurance card.              19.  Visit our web site for additional information:  [email or web address]              20.During flu season no children under the age of 15 are permitted in the hospital for the safety of all patients. 21. If you take a long acting insulin in the evening only  take half of your usual  dose the night  before your procedure              22. If you use a c-pap please bring DOS if staying overnight,             23.For your convenience [facility] has a pharmacy on site to fill your prescriptions. 24. If you use oxygen and have a portable tank please bring it  with you the DOS             25. Bring a complete list of all your medications with name and dose include any supplements. 26. Other__________________________________________   *Please call pre admission testing if you any further questions   [address] [place]  [phone]   [facility] [address] SOWMYA OSULLIVAN   [phone]           COVID TESTING    _X__ Covid test to be done 3-5 days prior to scheduled surgery -patient aware they are REQUIRED to bring a copy of the negative result DOS-if they receive a positive result to notify their surgeon         If known - indicate where patient is getting covid test done ____Urgent Care 2/25/22_______Negative________________________________________________    ___ Rapid - DOS    ___ Other___________________________________      Ricky Lively POLICY(subject to change)    There is a one visitor policy at [facility] for all surgeries and endoscopies. Whether the visitor can stay or will be asked to wait in the car will depend on the current policy and if social distancing can be maintained. The policy is subject to change at any time. Please make sure the visitor has a cell phone that is on,charged and able to accept calls, as this may be the way that the staff communicates with them. Pain management is NO VISITOR policyThe patients ride is expected to remain in the car with a cell phone for communication. If the ride is leaving the hospital grounds please make sure they are back in time for pickup. Have the patient inform the staff on arrival what their rides plans are while the patient is in the facility. At the MAIN there is one visitor allowed. Please note that the visitor policy is subject to change. All above information reviewed with patient in person or by phone. Patient verbalizes understanding. All questions and concerns addressed.                                                                                                  Patient/Rep__Patient__________________                                                                                                                                    PRE OP INSTRUCTIONS

## 2022-03-02 ENCOUNTER — ANESTHESIA (OUTPATIENT)
Dept: OPERATING ROOM | Age: 77
End: 2022-03-02
Payer: MEDICARE

## 2022-03-02 ENCOUNTER — HOSPITAL ENCOUNTER (OUTPATIENT)
Age: 77
Setting detail: OUTPATIENT SURGERY
Discharge: HOME | End: 2022-03-02
Attending: UROLOGY | Admitting: UROLOGY
Payer: MEDICARE

## 2022-03-02 ENCOUNTER — ANESTHESIA EVENT (OUTPATIENT)
Dept: OPERATING ROOM | Age: 77
End: 2022-03-02
Payer: MEDICARE

## 2022-03-02 VITALS
SYSTOLIC BLOOD PRESSURE: 123 MMHG | OXYGEN SATURATION: 94 % | RESPIRATION RATE: 14 BRPM | DIASTOLIC BLOOD PRESSURE: 61 MMHG | TEMPERATURE: 97.9 F

## 2022-03-02 VITALS
SYSTOLIC BLOOD PRESSURE: 170 MMHG | TEMPERATURE: 96.8 F | OXYGEN SATURATION: 95 % | RESPIRATION RATE: 17 BRPM | DIASTOLIC BLOOD PRESSURE: 89 MMHG | HEART RATE: 83 BPM | HEIGHT: 70 IN | WEIGHT: 228.7 LBS | BODY MASS INDEX: 32.74 KG/M2

## 2022-03-02 DIAGNOSIS — N32.81 OVERACTIVE BLADDER: Primary | ICD-10-CM

## 2022-03-02 LAB
ANION GAP SERPL CALCULATED.3IONS-SCNC: 12 MMOL/L (ref 3–16)
BUN BLDV-MCNC: 28 MG/DL (ref 7–20)
CALCIUM SERPL-MCNC: 10 MG/DL (ref 8.3–10.6)
CHLORIDE BLD-SCNC: 101 MMOL/L (ref 99–110)
CO2: 25 MMOL/L (ref 21–32)
CREAT SERPL-MCNC: 1.1 MG/DL (ref 0.6–1.2)
GFR AFRICAN AMERICAN: 58
GFR NON-AFRICAN AMERICAN: 48
GLUCOSE BLD-MCNC: 203 MG/DL (ref 70–99)
GLUCOSE BLD-MCNC: 206 MG/DL (ref 70–99)
HCT VFR BLD CALC: 43.1 % (ref 36–48)
HEMOGLOBIN: 14.2 G/DL (ref 12–16)
MCH RBC QN AUTO: 30.5 PG (ref 26–34)
MCHC RBC AUTO-ENTMCNC: 32.9 G/DL (ref 31–36)
MCV RBC AUTO: 92.7 FL (ref 80–100)
PDW BLD-RTO: 13.3 % (ref 12.4–15.4)
PERFORMED ON: ABNORMAL
PLATELET # BLD: 162 K/UL (ref 135–450)
PMV BLD AUTO: 9.4 FL (ref 5–10.5)
POTASSIUM SERPL-SCNC: 4.6 MMOL/L (ref 3.5–5.1)
RBC # BLD: 4.65 M/UL (ref 4–5.2)
SODIUM BLD-SCNC: 138 MMOL/L (ref 136–145)
WBC # BLD: 6.2 K/UL (ref 4–11)

## 2022-03-02 PROCEDURE — 2500000003 HC RX 250 WO HCPCS: Performed by: NURSE ANESTHETIST, CERTIFIED REGISTERED

## 2022-03-02 PROCEDURE — 3600000012 HC SURGERY LEVEL 2 ADDTL 15MIN: Performed by: UROLOGY

## 2022-03-02 PROCEDURE — 85027 COMPLETE CBC AUTOMATED: CPT

## 2022-03-02 PROCEDURE — 3700000000 HC ANESTHESIA ATTENDED CARE: Performed by: UROLOGY

## 2022-03-02 PROCEDURE — 3600000002 HC SURGERY LEVEL 2 BASE: Performed by: UROLOGY

## 2022-03-02 PROCEDURE — 7100000001 HC PACU RECOVERY - ADDTL 15 MIN: Performed by: UROLOGY

## 2022-03-02 PROCEDURE — 6360000002 HC RX W HCPCS: Performed by: UROLOGY

## 2022-03-02 PROCEDURE — 2580000003 HC RX 258: Performed by: UROLOGY

## 2022-03-02 PROCEDURE — 2709999900 HC NON-CHARGEABLE SUPPLY: Performed by: UROLOGY

## 2022-03-02 PROCEDURE — 6360000002 HC RX W HCPCS: Performed by: NURSE ANESTHETIST, CERTIFIED REGISTERED

## 2022-03-02 PROCEDURE — 80048 BASIC METABOLIC PNL TOTAL CA: CPT

## 2022-03-02 PROCEDURE — 2500000003 HC RX 250 WO HCPCS: Performed by: UROLOGY

## 2022-03-02 PROCEDURE — A4217 STERILE WATER/SALINE, 500 ML: HCPCS | Performed by: UROLOGY

## 2022-03-02 PROCEDURE — 7100000011 HC PHASE II RECOVERY - ADDTL 15 MIN: Performed by: UROLOGY

## 2022-03-02 PROCEDURE — 36415 COLL VENOUS BLD VENIPUNCTURE: CPT

## 2022-03-02 PROCEDURE — 7100000000 HC PACU RECOVERY - FIRST 15 MIN: Performed by: UROLOGY

## 2022-03-02 PROCEDURE — 7100000010 HC PHASE II RECOVERY - FIRST 15 MIN: Performed by: UROLOGY

## 2022-03-02 PROCEDURE — 3700000001 HC ADD 15 MINUTES (ANESTHESIA): Performed by: UROLOGY

## 2022-03-02 RX ORDER — SUCCINYLCHOLINE/SOD CL,ISO/PF 200MG/10ML
SYRINGE (ML) INTRAVENOUS PRN
Status: DISCONTINUED | OUTPATIENT
Start: 2022-03-02 | End: 2022-03-02 | Stop reason: SDUPTHER

## 2022-03-02 RX ORDER — ONDANSETRON 2 MG/ML
INJECTION INTRAMUSCULAR; INTRAVENOUS PRN
Status: DISCONTINUED | OUTPATIENT
Start: 2022-03-02 | End: 2022-03-02 | Stop reason: SDUPTHER

## 2022-03-02 RX ORDER — HYDROMORPHONE HCL 110MG/55ML
0.5 PATIENT CONTROLLED ANALGESIA SYRINGE INTRAVENOUS EVERY 5 MIN PRN
Status: DISCONTINUED | OUTPATIENT
Start: 2022-03-02 | End: 2022-03-02 | Stop reason: HOSPADM

## 2022-03-02 RX ORDER — AMOXICILLIN 250 MG
1 CAPSULE ORAL 2 TIMES DAILY
Qty: 30 TABLET | Refills: 1 | Status: SHIPPED | OUTPATIENT
Start: 2022-03-02 | End: 2022-04-01

## 2022-03-02 RX ORDER — SODIUM CHLORIDE 9 MG/ML
25 INJECTION, SOLUTION INTRAVENOUS PRN
Status: DISCONTINUED | OUTPATIENT
Start: 2022-03-02 | End: 2022-03-02 | Stop reason: HOSPADM

## 2022-03-02 RX ORDER — HYDROCODONE BITARTRATE AND ACETAMINOPHEN 5; 325 MG/1; MG/1
1 TABLET ORAL EVERY 6 HOURS PRN
Qty: 20 TABLET | Refills: 0 | Status: SHIPPED | OUTPATIENT
Start: 2022-03-02 | End: 2022-03-07

## 2022-03-02 RX ORDER — EPHEDRINE SULFATE/0.9% NACL/PF 50 MG/5 ML
SYRINGE (ML) INTRAVENOUS PRN
Status: DISCONTINUED | OUTPATIENT
Start: 2022-03-02 | End: 2022-03-02 | Stop reason: SDUPTHER

## 2022-03-02 RX ORDER — CIPROFLOXACIN 2 MG/ML
400 INJECTION, SOLUTION INTRAVENOUS
Status: COMPLETED | OUTPATIENT
Start: 2022-03-02 | End: 2022-03-02

## 2022-03-02 RX ORDER — PROPOFOL 10 MG/ML
INJECTION, EMULSION INTRAVENOUS PRN
Status: DISCONTINUED | OUTPATIENT
Start: 2022-03-02 | End: 2022-03-02 | Stop reason: SDUPTHER

## 2022-03-02 RX ORDER — HYDROMORPHONE HCL 110MG/55ML
0.25 PATIENT CONTROLLED ANALGESIA SYRINGE INTRAVENOUS EVERY 5 MIN PRN
Status: DISCONTINUED | OUTPATIENT
Start: 2022-03-02 | End: 2022-03-02 | Stop reason: HOSPADM

## 2022-03-02 RX ORDER — SODIUM CHLORIDE 0.9 % (FLUSH) 0.9 %
5-40 SYRINGE (ML) INJECTION EVERY 12 HOURS SCHEDULED
Status: DISCONTINUED | OUTPATIENT
Start: 2022-03-02 | End: 2022-03-02 | Stop reason: HOSPADM

## 2022-03-02 RX ORDER — ROCURONIUM BROMIDE 10 MG/ML
INJECTION, SOLUTION INTRAVENOUS PRN
Status: DISCONTINUED | OUTPATIENT
Start: 2022-03-02 | End: 2022-03-02 | Stop reason: SDUPTHER

## 2022-03-02 RX ORDER — DEXAMETHASONE SODIUM PHOSPHATE 4 MG/ML
INJECTION, SOLUTION INTRA-ARTICULAR; INTRALESIONAL; INTRAMUSCULAR; INTRAVENOUS; SOFT TISSUE PRN
Status: DISCONTINUED | OUTPATIENT
Start: 2022-03-02 | End: 2022-03-02 | Stop reason: SDUPTHER

## 2022-03-02 RX ORDER — DIPHENHYDRAMINE HYDROCHLORIDE 50 MG/ML
12.5 INJECTION INTRAMUSCULAR; INTRAVENOUS
Status: DISCONTINUED | OUTPATIENT
Start: 2022-03-02 | End: 2022-03-02 | Stop reason: HOSPADM

## 2022-03-02 RX ORDER — SODIUM CHLORIDE 9 MG/ML
INJECTION, SOLUTION INTRAVENOUS CONTINUOUS
Status: DISCONTINUED | OUTPATIENT
Start: 2022-03-02 | End: 2022-03-02 | Stop reason: HOSPADM

## 2022-03-02 RX ORDER — FENTANYL CITRATE 50 UG/ML
INJECTION, SOLUTION INTRAMUSCULAR; INTRAVENOUS PRN
Status: DISCONTINUED | OUTPATIENT
Start: 2022-03-02 | End: 2022-03-02 | Stop reason: SDUPTHER

## 2022-03-02 RX ORDER — BUPIVACAINE HYDROCHLORIDE 5 MG/ML
INJECTION, SOLUTION EPIDURAL; INTRACAUDAL
Status: COMPLETED | OUTPATIENT
Start: 2022-03-02 | End: 2022-03-02

## 2022-03-02 RX ORDER — ONDANSETRON 2 MG/ML
4 INJECTION INTRAMUSCULAR; INTRAVENOUS
Status: DISCONTINUED | OUTPATIENT
Start: 2022-03-02 | End: 2022-03-02 | Stop reason: HOSPADM

## 2022-03-02 RX ORDER — SODIUM CHLORIDE 0.9 % (FLUSH) 0.9 %
5-40 SYRINGE (ML) INJECTION PRN
Status: DISCONTINUED | OUTPATIENT
Start: 2022-03-02 | End: 2022-03-02 | Stop reason: HOSPADM

## 2022-03-02 RX ORDER — MEPERIDINE HYDROCHLORIDE 25 MG/ML
12.5 INJECTION INTRAMUSCULAR; INTRAVENOUS; SUBCUTANEOUS EVERY 5 MIN PRN
Status: DISCONTINUED | OUTPATIENT
Start: 2022-03-02 | End: 2022-03-02 | Stop reason: HOSPADM

## 2022-03-02 RX ORDER — LIDOCAINE HYDROCHLORIDE 10 MG/ML
0.5 INJECTION, SOLUTION EPIDURAL; INFILTRATION; INTRACAUDAL; PERINEURAL ONCE
Status: DISCONTINUED | OUTPATIENT
Start: 2022-03-02 | End: 2022-03-02 | Stop reason: HOSPADM

## 2022-03-02 RX ORDER — MAGNESIUM HYDROXIDE 1200 MG/15ML
LIQUID ORAL CONTINUOUS PRN
Status: COMPLETED | OUTPATIENT
Start: 2022-03-02 | End: 2022-03-02

## 2022-03-02 RX ORDER — LIDOCAINE HYDROCHLORIDE 20 MG/ML
INJECTION, SOLUTION EPIDURAL; INFILTRATION; INTRACAUDAL; PERINEURAL PRN
Status: DISCONTINUED | OUTPATIENT
Start: 2022-03-02 | End: 2022-03-02 | Stop reason: SDUPTHER

## 2022-03-02 RX ADMIN — DEXAMETHASONE SODIUM PHOSPHATE 10 MG: 4 INJECTION, SOLUTION INTRAMUSCULAR; INTRAVENOUS at 09:16

## 2022-03-02 RX ADMIN — ROCURONIUM BROMIDE 5 MG: 10 INJECTION, SOLUTION INTRAVENOUS at 09:23

## 2022-03-02 RX ADMIN — PROPOFOL 150 MG: 10 INJECTION, EMULSION INTRAVENOUS at 09:04

## 2022-03-02 RX ADMIN — ROCURONIUM BROMIDE 10 MG: 10 INJECTION, SOLUTION INTRAVENOUS at 09:04

## 2022-03-02 RX ADMIN — CIPROFLOXACIN 400 MG: 2 INJECTION, SOLUTION INTRAVENOUS at 08:56

## 2022-03-02 RX ADMIN — Medication 120 MG: at 09:04

## 2022-03-02 RX ADMIN — Medication 10 MG: at 09:21

## 2022-03-02 RX ADMIN — FENTANYL CITRATE 50 MCG: 50 INJECTION, SOLUTION INTRAMUSCULAR; INTRAVENOUS at 08:58

## 2022-03-02 RX ADMIN — ROCURONIUM BROMIDE 15 MG: 10 INJECTION, SOLUTION INTRAVENOUS at 09:11

## 2022-03-02 RX ADMIN — LIDOCAINE HYDROCHLORIDE 100 MG: 20 INJECTION, SOLUTION EPIDURAL; INFILTRATION; INTRACAUDAL; PERINEURAL at 09:04

## 2022-03-02 RX ADMIN — SODIUM CHLORIDE: 9 INJECTION, SOLUTION INTRAVENOUS at 07:19

## 2022-03-02 RX ADMIN — FENTANYL CITRATE 50 MCG: 50 INJECTION, SOLUTION INTRAMUSCULAR; INTRAVENOUS at 09:04

## 2022-03-02 RX ADMIN — ONDANSETRON 4 MG: 2 INJECTION INTRAMUSCULAR; INTRAVENOUS at 09:34

## 2022-03-02 RX ADMIN — Medication 10 MG: at 09:32

## 2022-03-02 RX ADMIN — SUGAMMADEX 200 MG: 100 INJECTION, SOLUTION INTRAVENOUS at 09:43

## 2022-03-02 ASSESSMENT — PULMONARY FUNCTION TESTS
PIF_VALUE: 2
PIF_VALUE: 26
PIF_VALUE: 26
PIF_VALUE: 25
PIF_VALUE: 2
PIF_VALUE: 28
PIF_VALUE: 3
PIF_VALUE: 25
PIF_VALUE: 2
PIF_VALUE: 26
PIF_VALUE: 26
PIF_VALUE: 24
PIF_VALUE: 26
PIF_VALUE: 7
PIF_VALUE: 4
PIF_VALUE: 26
PIF_VALUE: 25
PIF_VALUE: 2
PIF_VALUE: 29
PIF_VALUE: 25
PIF_VALUE: 27
PIF_VALUE: 0
PIF_VALUE: 25
PIF_VALUE: 2
PIF_VALUE: 23
PIF_VALUE: 21
PIF_VALUE: 0
PIF_VALUE: 25
PIF_VALUE: 26
PIF_VALUE: 24
PIF_VALUE: 23
PIF_VALUE: 22
PIF_VALUE: 1
PIF_VALUE: 26
PIF_VALUE: 0
PIF_VALUE: 26
PIF_VALUE: 26
PIF_VALUE: 23
PIF_VALUE: 25
PIF_VALUE: 11
PIF_VALUE: 25
PIF_VALUE: 2
PIF_VALUE: 25
PIF_VALUE: 21
PIF_VALUE: 3
PIF_VALUE: 5
PIF_VALUE: 2

## 2022-03-02 ASSESSMENT — PAIN - FUNCTIONAL ASSESSMENT: PAIN_FUNCTIONAL_ASSESSMENT: 0-10

## 2022-03-02 ASSESSMENT — PAIN SCALES - GENERAL
PAINLEVEL_OUTOF10: 0
PAINLEVEL_OUTOF10: 0

## 2022-03-02 NOTE — ANESTHESIA PRE PROCEDURE
Department of Anesthesiology  Preprocedure Note       Name:  Kimberly Velez   Age:  64 y.o.  :  1945                                          MRN:  [id number]         Date:  3/2/2022      Surgeon: Issac Lucero):  Tani Tierney MD    Procedure: Procedure(s):  INTERSTIM REMOVAL OF BATTERY AND LEADS    Medications prior to admission:   Prior to Admission medications    Medication Sig Start Date End Date Taking? Authorizing Provider   glimepiride (AMARYL) 2 MG tablet Take 2 mg by mouth every morning (before breakfast)    Historical Provider, MD   glimepiride (AMARYL) 1 MG tablet Take 1 mg by mouth nightly    Historical Provider, MD   atorvastatin (LIPITOR) 20 MG tablet Take 20 mg by mouth daily    Historical Provider, MD   levothyroxine (SYNTHROID) 125 MCG tablet Take 125 mcg by mouth Daily    Historical Provider, MD   diazePAM (VALIUM) 5 MG tablet Take 5 mg by mouth every 6 hours as needed for Anxiety. Historical Provider, MD   aspirin 81 MG EC tablet Take 81 mg by mouth daily    Historical Provider, MD   magnesium gluconate (MAGONATE) 500 MG tablet Take 500 mg by mouth daily    Historical Provider, MD   pyridoxine (B-6) 100 MG tablet Take 50 mg by mouth daily    Historical Provider, MD   VITAMIN D PO Take by mouth daily    Historical Provider, MD   Omega-3 Fatty Acids (FISH OIL) 1000 MG CAPS Take 1,000 mg by mouth daily    Historical Provider, MD   NONFORMULARY Omega 7 daily    Historical Provider, MD   ELDERBERRY PO Take by mouth daily    Historical Provider, MD   Multiple Vitamins-Minerals (THERAPEUTIC MULTIVITAMIN-MINERALS) tablet Take 1 tablet by mouth daily    Historical Provider, MD   OXYGEN Inhale 2 L into the lungs nightly    Historical Provider, MD   NONFORMULARY CALCIUM 400 MG DAILY    Historical Provider, MD   PARoxetine (PAXIL) 20 MG tablet Take 20 mg by mouth every morning.       Historical Provider, MD   loratadine (CLARITIN) 10 MG tablet Take 10 mg by mouth as needed     Historical Provider, MD Current medications:    No current facility-administered medications for this visit. No current outpatient medications on file. Facility-Administered Medications Ordered in Other Visits   Medication Dose Route Frequency Provider Last Rate Last Admin    ciprofloxacin (CIPRO) IVPB 400 mg  400 mg IntraVENous On Call to Clarita Rao MD        lidocaine PF 1 % injection 0.5 mL  0.5 mL IntraDERmal Once Inez Minor MD        0.9 % sodium chloride infusion   IntraVENous Continuous Inez Minor MD   New Bag at 22 0719       Allergies: Allergies   Allergen Reactions    Codeine Anaphylaxis    Dye [Iodides]      syncope       Problem List:    Patient Active Problem List   Diagnosis Code    Overactive bladder N32.81       Past Medical History:        Diagnosis Date    Hypertension     Hypothyroidism     Prolonged emergence from general anesthesia     Type II or unspecified type diabetes mellitus without mention of complication, not stated as uncontrolled        Past Surgical History:        Procedure Laterality Date    CATARACT REMOVAL      HYSTERECTOMY      JOINT REPLACEMENT      L.Knee    SHOULDER ARTHROPLASTY      STIMULATOR SURGERY N/A 2021    INTERSTIM STAGE 1 performed by Inez Minor MD at [address] [place] [address] N/A 2021    INTERSTIM STAGE 2 (BIPOLAR) performed by Inez Minor MD at [place] [address] History:    Social History     Tobacco Use    Smoking status: Former Smoker     Quit date: 1982     Years since quittin.6    Smokeless tobacco: Never Used   Substance Use Topics    Alcohol use: No                                Counseling given: Not Answered      Vital Signs (Current): There were no vitals filed for this visit.                                            BP Readings from Last 3 Encounters:   21 (!) 158/86   21 121/62   21 (!) 175/89       NPO Status: BMI:   Wt Readings from Last 3 Encounters:   02/28/22 225 lb (102.1 kg)   04/21/21 235 lb (106.6 kg)   04/07/21 228 lb (103.4 kg)     There is no height or weight on file to calculate BMI.    CBC:   Lab Results   Component Value Date    WBC 4.7 04/07/2021    RBC 4.44 04/07/2021    HGB 13.3 04/07/2021    HCT 41.1 04/07/2021    MCV 92.6 04/07/2021    RDW 12.6 04/07/2021     04/07/2021       CMP:   Lab Results   Component Value Date     04/07/2021    K 4.7 04/07/2021     04/07/2021    CO2 28 04/07/2021    BUN 23 04/07/2021    CREATININE 0.9 04/07/2021    GFRAA >60 04/07/2021    LABGLOM >60 04/07/2021    GLUCOSE 216 04/07/2021    CALCIUM 8.6 04/07/2021       POC Tests:   Recent Labs     03/02/22  0755   POCGLU 203*       Coags: No results found for: PROTIME, INR, APTT    HCG (If Applicable): No results found for: PREGTESTUR, PREGSERUM, HCG, HCGQUANT     ABGs: No results found for: PHART, PO2ART, XDC5HJW, IKL7UHX, BEART, K3UYVAFE     Type & Screen (If Applicable):  No results found for: LABABO, LABRH    Drug/Infectious Status (If Applicable):  No results found for: HIV, HEPCAB    COVID-19 Screening (If Applicable):   Lab Results   Component Value Date    COVID19 Not Detected 04/21/2021    COVID19 Not Detected 04/02/2021           Anesthesia Evaluation  Patient summary reviewed and Nursing notes reviewed history of anesthetic complications (prolonged emergence): Airway: Mallampati: II  TM distance: >3 FB   Neck ROM: full  Mouth opening: > = 3 FB Dental: normal exam         Pulmonary:       (-) rhonchi and wheezes                          ROS comment: Oxygen 2L nc at night  Former smoker   Cardiovascular:  Exercise tolerance: poor (<4 METS),   (+) hypertension: moderate,     (-) CABG/stent, dysrhythmias and  angina      Rhythm: regular  Rate: normal                 ROS comment: SUMMARY:    1. Left ventricle:  The cavity size was normal. Wall thickness was     normal. Systolic function was at the lower limits of normal. The     estimated ejection fraction was in the range of 50% to 55%.   Although no diagnostic regional wall motion abnormality was     identified, this possibility cannot be completely excluded on     the basis of this study. The tissue Doppler parameters were     abnormal. Doppler parameters are consistent with abnormal left     ventricular relaxation (grade 1 diastolic dysfunction). Cannot     exclude thrombus. 2. Right ventricle: The cavity size was normal. Wall thickness was     normal. Systolic function was normal.    Impressions:  No prior study was available for comparison. Neuro/Psych:      (-) seizures, TIA and CVA           GI/Hepatic/Renal:        (-) GERD       Endo/Other:    (+) DiabetesType II DM, well controlled, , hypothyroidism::., .                  ROS comment: Anaphylaxis to codeine Abdominal:             Vascular: Other Findings:               Anesthesia Plan      general     ASA 3       Induction: intravenous. MIPS: Postoperative opioids intended, Prophylactic antiemetics administered and Postoperative trial extubation. Anesthetic plan and risks discussed with patient. Use of blood products discussed with patient whom consented to blood products. Plan discussed with CRNA.                   Bryant Bliss MD   3/2/2022

## 2022-03-02 NOTE — ANESTHESIA POSTPROCEDURE EVALUATION
Department of Anesthesiology  Postprocedure Note    Patient: Bisi Barcenas  MRN: [id number]  YOB: 1945  Date of evaluation: 3/2/2022  Time:  10:05 AM     Procedure Summary     Date: 03/02/22 Room / Location: [address] [place] [address]    Anesthesia Start: 5848 Anesthesia Stop: 1112    Procedure: INTERSTIM REMOVAL OF BATTERY AND LEADS (N/A ) Diagnosis: (N32.81 OVERACTIVE BLADDER)    Surgeons: Adriana Ta MD Responsible Provider: Bryant Bliss MD    Anesthesia Type: general ASA Status: 3          Anesthesia Type: general    Nannette Phase I: Nannette Score: 10    Nannette Phase II:      Last vitals: Reviewed and per EMR flowsheets.        Anesthesia Post Evaluation    Patient location during evaluation: PACU  Patient participation: complete - patient participated  Level of consciousness: awake and alert  Pain score: 2  Airway patency: patent  Nausea & Vomiting: no vomiting  Complications: no  Cardiovascular status: blood pressure returned to baseline  Respiratory status: acceptable  Hydration status: euvolemic  Multimodal analgesia pain management approach

## 2022-03-02 NOTE — PROGRESS NOTES
Pt arrive to bay 4 for phase II recovery. Pt is awake and alert, VSS. Pt denies pain at this time. Surgical site LENORA, approximated, and without drainage.

## 2022-03-02 NOTE — OP NOTE
Urology Operative Note  [facility]     Patient: Radha Ricardo MRN: [id number]  Room/Bed: OR/NONE   YOB: 1945  Age/Sex: 64 y. o.female  Admission Date: 3/2/2022     Date of Operation: 3/2/2022    Preoperative Diagnosis: Overactive bladder with prior InterStim requesting removal    Postoperative Diagnosis: same    Procedure:    1. Explant of InterStim device    Surgeon:   Tyrone Valverde MD, MASON    Anesthesia: General LMA anesthesia    Indications: Radha Ricardo is a 64 y.o. female who presents for the above named surgery. Informed consent was obtained and the risks, benefits, and details of the procedure were explained to the patient who elected to proceed. The patient had previously failed at least 2 medications used to treat their diagnosis. Details of Procedure: The patient was brought to the operating room and placed in the supine position on the stretcher. SCDs were placed on the lower extremities. Following induction of anesthesia the patient was positioned prone on the OR table. The back was prepped and draped in the usual sterile fashion. A routine timeout was performed, confirming the patient, procedure, site, risk of fire, patient allergies and confirming that preoperative antibiotics had been administered prior to incision. The patient's battery was located and local anesthetic was administered. The same scar was incised and the battery was removed. The lead was cut. I then pulled in the lead and identified the midline incision for the lead insertion in the sacrum. Local was also injected in this area and a second incision was made and the lead was pulled through. Careful dissection was made to free the end of the lead and was this was removed with gentle pulling of the lead and easily removed in its entirety. The wounds were irrigated. There were closed with 3-0 Vicryl and 4-0 Monocryl with Dermabond.     At the end of the procedure all needle, lap,

## 2022-03-02 NOTE — H&P
Urology Preoperative History & Physical  [facility]     Patient: Bisi Barcenas MRN: [id number]  Room/Bed: OR/NONE   YOB: 1945  Age/Sex: 64 y. o.female  Admission Date: 3/2/2022     Date of Service:  3/2/2022    ASSESSMENT/PLAN     Interstim explant    Plan: To OR for above procedure    All patient questions were answered. She understands the plan as listed above. HISTORY     Chief Complaint: As above    History of Present Illness: Bisi Barcenas is a 64 y.o. female with above listed problems. No changes in history/physical since last evaluation. No change in symptoms. Past Medical History:  She has a past medical history of Hypertension, Hypothyroidism, Prolonged emergence from general anesthesia, and Type II or unspecified type diabetes mellitus without mention of complication, not stated as uncontrolled. Hospital Problem List:  Active Problems:    * No active hospital problems. *  Resolved Problems:    * No resolved hospital problems. *      Past Surgical History:  She has a past surgical history that includes Total shoulder arthroplasty; joint replacement; Cataract removal; Hysterectomy; Stimulator Surgery (N/A, 4/7/2021); and Stimulator Surgery (N/A, 4/21/2021). Social History:  She reports that she quit smoking about 39 years ago. She has never used smokeless tobacco. She reports that she does not drink alcohol. Family History:  family history includes Arthritis in her sister; Heart Attack in her mother; Stroke in her father. Allergies:   Allergies   Allergen Reactions    Codeine Anaphylaxis    Dye [Iodides]      syncope       Medications:  Scheduled Meds:   ciprofloxacin  400 mg IntraVENous On Call to OR    lidocaine PF  0.5 mL IntraDERmal Once    sodium chloride flush  5-40 mL IntraVENous 2 times per day     Continuous Infusions:   sodium chloride      sodium chloride       PRN Meds:sodium chloride flush, sodium chloride, meperidine, HYDROmorphone, HYDROmorphone, ondansetron, diphenhydrAMINE    Review of Systems:  Pertinent positives/negatives reviewed in HPI. All other systems reviewed and negative, unless noted below. Constitutional: Negative  Genitourinary: see HPI  HEENT: Negative   Cardiovascular: Negative   Respiratory: Negative   Gastrointestinal: Negative   Musculoskeletal: Negative   Neurological: Negative   Psychiatric: Negative   Integumentary: Negative     PHYSICAL EXAM     Vitals:    03/02/22 0810   BP: (!) 158/78   Pulse: 76   Resp: 18   Temp: 97 °F (36.1 °C)   SpO2: 95%     CONSTITUTIONAL: The patient is well nourished/developed, with no distress noted. CARDIOVASCULAR: normal rate. RESPIRATOR: non-labored breathing. GENITOURINARY: Defer to OR; see clinic note for  exam.    Ins/Outs:  No intake or output data in the 24 hours ending 03/02/22 0831    LABS     CBC   Lab Results   Component Value Date    WBC 6.2 03/02/2022    RBC 4.65 03/02/2022    HGB 14.2 03/02/2022    HCT 43.1 03/02/2022    MCV 92.7 03/02/2022    MCH 30.5 03/02/2022    MCHC 32.9 03/02/2022    RDW 13.3 03/02/2022     03/02/2022    MPV 9.4 03/02/2022     BMP   Lab Results   Component Value Date     03/02/2022    K 4.6 03/02/2022     03/02/2022    CO2 25 03/02/2022    BUN 28 03/02/2022    CREATININE 1.1 03/02/2022    GLUCOSE 206 03/02/2022    CALCIUM 10.0 03/02/2022     Urinalysis: No results found for: COLORU, GLUCOSEU, BLOODU, NITRU, LEUKOCYTESUR  Urine culture: No results for input(s): Ashwini Novoa in the last 72 hours. PSA: No results found for: PSA      IMAGING     No results found.          Electronically signed by: Jessica Soares MD MD, MASON 3/2/2022   [facility]  Office Contact: [phone]

## 2022-03-02 NOTE — DISCHARGE INSTR - DIET

## 2022-03-15 ENCOUNTER — CLINICAL DOCUMENTATION (OUTPATIENT)
Dept: OTHER | Age: 77
End: 2022-03-15

## 2022-03-22 ENCOUNTER — TELEPHONE (OUTPATIENT)
Dept: ORTHOPEDIC SURGERY | Age: 77
End: 2022-03-22

## 2024-07-31 NOTE — PROGRESS NOTES
Patient not reached.  Preop instructions left on voice mail. [facility]__[phone]_____________    -Date_8/1/2024______time_1315______arrival_1145__main_________  -Nothing to eat or drink after midnight  -Responsible adult 18 or older to stay on site while you are here and drive you home and stay with you after  -Follow any instructions your doctors office has given you  -Bring a complete list of all your medications and supplements  -If you normally take the following medications in the morning please do so with a small    sip of water-heart,blood pressure,seizure,breathing or thyroid-avoid water pilll Do not take blood pressure medications ending in \"rose\" or \"pril\" the AM of surgery or the tao prior  -You may use your inhalers  -Take half of your normal dose of any long acting insulins the night before-do not take    any diabetic medications in the morning  -If you are taking a weekly injection for diabetes or weight loss-do not take one week prior to surgery. If you have already taken your injection this week, contact your surgeon  -Follow your doctors instructions regarding blood thinners  -Any questions call your surgeons office            VISITOR POLICY(subject to change)    Current policy is 2 visitors per patient. No children. Masks at discretion of facility. Visiting hours are 8a-8p. Overnight visitors will be at the discretion of the nurse. All policies are subject to change.

## 2024-08-01 ENCOUNTER — ANESTHESIA (OUTPATIENT)
Dept: OPERATING ROOM | Age: 79
End: 2024-08-01
Payer: MEDICARE

## 2024-08-01 ENCOUNTER — ANESTHESIA EVENT (OUTPATIENT)
Dept: OPERATING ROOM | Age: 79
End: 2024-08-01
Payer: MEDICARE

## 2024-08-01 ENCOUNTER — HOSPITAL ENCOUNTER (OUTPATIENT)
Age: 79
Setting detail: OUTPATIENT SURGERY
Discharge: HOME | End: 2024-08-01
Attending: UROLOGY | Admitting: UROLOGY
Payer: MEDICARE

## 2024-08-01 VITALS
OXYGEN SATURATION: 100 % | DIASTOLIC BLOOD PRESSURE: 70 MMHG | SYSTOLIC BLOOD PRESSURE: 165 MMHG | BODY MASS INDEX: 32.5 KG/M2 | TEMPERATURE: 97 F | WEIGHT: 227 LBS | HEIGHT: 70 IN | RESPIRATION RATE: 14 BRPM | HEART RATE: 65 BPM

## 2024-08-01 LAB
ANION GAP SERPL CALCULATED.3IONS-SCNC: 11 MMOL/L (ref 3–16)
BUN SERPL-MCNC: 20 MG/DL (ref 7–20)
CALCIUM SERPL-MCNC: 9.6 MG/DL (ref 8.3–10.6)
CHLORIDE SERPL-SCNC: 100 MMOL/L (ref 99–110)
CO2 SERPL-SCNC: 27 MMOL/L (ref 21–32)
CREAT SERPL-MCNC: 0.8 MG/DL (ref 0.6–1.2)
GFR SERPLBLD CREATININE-BSD FMLA CKD-EPI: 74 ML/MIN/{1.73_M2}
GLUCOSE BLD-MCNC: 172 MG/DL (ref 70–99)
GLUCOSE BLD-MCNC: 202 MG/DL (ref 70–99)
GLUCOSE SERPL-MCNC: 215 MG/DL (ref 70–99)
PERFORMED ON: ABNORMAL
PERFORMED ON: ABNORMAL
POTASSIUM SERPL-SCNC: 4.6 MMOL/L (ref 3.5–5.1)
SODIUM SERPL-SCNC: 138 MMOL/L (ref 136–145)

## 2024-08-01 PROCEDURE — 7100000001 HC PACU RECOVERY - ADDTL 15 MIN: Performed by: UROLOGY

## 2024-08-01 PROCEDURE — 3700000000 HC ANESTHESIA ATTENDED CARE: Performed by: UROLOGY

## 2024-08-01 PROCEDURE — 80048 BASIC METABOLIC PNL TOTAL CA: CPT

## 2024-08-01 PROCEDURE — 3700000001 HC ADD 15 MINUTES (ANESTHESIA): Performed by: UROLOGY

## 2024-08-01 PROCEDURE — 7100000010 HC PHASE II RECOVERY - FIRST 15 MIN: Performed by: UROLOGY

## 2024-08-01 PROCEDURE — 2709999900 HC NON-CHARGEABLE SUPPLY: Performed by: UROLOGY

## 2024-08-01 PROCEDURE — 6360000002 HC RX W HCPCS: Performed by: UROLOGY

## 2024-08-01 PROCEDURE — 2500000003 HC RX 250 WO HCPCS: Performed by: NURSE ANESTHETIST, CERTIFIED REGISTERED

## 2024-08-01 PROCEDURE — 3600000004 HC SURGERY LEVEL 4 BASE: Performed by: UROLOGY

## 2024-08-01 PROCEDURE — 2580000003 HC RX 258: Performed by: UROLOGY

## 2024-08-01 PROCEDURE — 2580000003 HC RX 258: Performed by: ANESTHESIOLOGY

## 2024-08-01 PROCEDURE — 6360000002 HC RX W HCPCS: Performed by: NURSE ANESTHETIST, CERTIFIED REGISTERED

## 2024-08-01 PROCEDURE — 7100000000 HC PACU RECOVERY - FIRST 15 MIN: Performed by: UROLOGY

## 2024-08-01 PROCEDURE — 36415 COLL VENOUS BLD VENIPUNCTURE: CPT

## 2024-08-01 PROCEDURE — 7100000011 HC PHASE II RECOVERY - ADDTL 15 MIN: Performed by: UROLOGY

## 2024-08-01 PROCEDURE — 3600000014 HC SURGERY LEVEL 4 ADDTL 15MIN: Performed by: UROLOGY

## 2024-08-01 RX ORDER — CEFAZOLIN SODIUM 1 G/3ML
INJECTION, POWDER, FOR SOLUTION INTRAMUSCULAR; INTRAVENOUS PRN
Status: DISCONTINUED | OUTPATIENT
Start: 2024-08-01 | End: 2024-08-01 | Stop reason: SDUPTHER

## 2024-08-01 RX ORDER — LIDOCAINE HYDROCHLORIDE 20 MG/ML
INJECTION, SOLUTION EPIDURAL; INFILTRATION; INTRACAUDAL; PERINEURAL PRN
Status: DISCONTINUED | OUTPATIENT
Start: 2024-08-01 | End: 2024-08-01 | Stop reason: SDUPTHER

## 2024-08-01 RX ORDER — WATER 10 ML/10ML
INJECTION INTRAMUSCULAR; INTRAVENOUS; SUBCUTANEOUS
Status: DISCONTINUED
Start: 2024-08-01 | End: 2024-08-01 | Stop reason: HOSPADM

## 2024-08-01 RX ORDER — PROPOFOL 10 MG/ML
INJECTION, EMULSION INTRAVENOUS PRN
Status: DISCONTINUED | OUTPATIENT
Start: 2024-08-01 | End: 2024-08-01 | Stop reason: SDUPTHER

## 2024-08-01 RX ORDER — CEFAZOLIN 2 G/1
INJECTION, POWDER, FOR SOLUTION INTRAMUSCULAR; INTRAVENOUS
Status: COMPLETED
Start: 2024-08-01 | End: 2024-08-01

## 2024-08-01 RX ORDER — FENTANYL CITRATE 50 UG/ML
INJECTION, SOLUTION INTRAMUSCULAR; INTRAVENOUS PRN
Status: DISCONTINUED | OUTPATIENT
Start: 2024-08-01 | End: 2024-08-01 | Stop reason: SDUPTHER

## 2024-08-01 RX ORDER — SODIUM CHLORIDE 9 MG/ML
INJECTION, SOLUTION INTRAVENOUS CONTINUOUS
Status: DISCONTINUED | OUTPATIENT
Start: 2024-08-01 | End: 2024-08-01 | Stop reason: HOSPADM

## 2024-08-01 RX ADMIN — PROPOFOL 100 MCG/KG/MIN: 10 INJECTION, EMULSION INTRAVENOUS at 13:01

## 2024-08-01 RX ADMIN — Medication 10 MG: at 13:02

## 2024-08-01 RX ADMIN — LIDOCAINE HYDROCHLORIDE 100 MG: 20 INJECTION, SOLUTION EPIDURAL; INFILTRATION; INTRACAUDAL; PERINEURAL at 13:00

## 2024-08-01 RX ADMIN — SODIUM CHLORIDE: 9 INJECTION, SOLUTION INTRAVENOUS at 12:52

## 2024-08-01 RX ADMIN — FENTANYL CITRATE 25 MCG: 50 INJECTION, SOLUTION INTRAMUSCULAR; INTRAVENOUS at 13:00

## 2024-08-01 RX ADMIN — Medication 10 MG: at 13:11

## 2024-08-01 RX ADMIN — CEFAZOLIN 2 G: 1 INJECTION, POWDER, FOR SOLUTION INTRAVENOUS at 13:07

## 2024-08-01 RX ADMIN — PROPOFOL 30 MG: 10 INJECTION, EMULSION INTRAVENOUS at 13:00

## 2024-08-01 ASSESSMENT — ENCOUNTER SYMPTOMS: SHORTNESS OF BREATH: 1

## 2024-08-01 ASSESSMENT — PAIN - FUNCTIONAL ASSESSMENT
PAIN_FUNCTIONAL_ASSESSMENT: 0-10
PAIN_FUNCTIONAL_ASSESSMENT: 0-10

## 2024-08-01 NOTE — ANESTHESIA POSTPROCEDURE EVALUATION
Department of Anesthesiology  Postprocedure Note    Patient: Jaz Marino  MRN: [id number]  YOB: 1945  Date of evaluation: 8/1/2024    Procedure Summary       Date: 08/01/24 Room / Location: [address]    Anesthesia Start: 1252 Anesthesia Stop: 1326    Procedure: CYSTOSCOPY WITH BOTOX INJECTION (100 units) (Urethra) Diagnosis:       Urge incontinence      (Urge incontinence [N39.41])    Surgeons: Paulie Langston MD Responsible Provider: Jasper Laresn MD    Anesthesia Type: MAC, general ASA Status: 3            Anesthesia Type: No value filed.    Nannette Phase I: Nannette Score: 8    Nannette Phase II: Nannette Score: 9    Anesthesia Post Evaluation    Patient location during evaluation: PACU  Patient participation: complete - patient participated  Level of consciousness: awake  Airway patency: patent  Nausea & Vomiting: no nausea and no vomiting  Cardiovascular status: hemodynamically stable  Respiratory status: acceptable  Hydration status: stable  Multimodal analgesia pain management approach  Pain management: adequate    No notable events documented.

## 2024-08-01 NOTE — PROGRESS NOTES
Discharge instructions reviewed with patient and daughter, paper copy given, medication details discussed, all questions answered, and verbalized understanding.    Patient states she is ready for discharge. Tolerating PO liquids and crackers.

## 2024-08-01 NOTE — PROGRESS NOTES
Patient awakens to voice, denies pain/nausea. Back to baseline with O2, 100% on 2L. BS - 172. Phase 1 recovery completed. Will transfer to [facility].

## 2024-08-01 NOTE — DISCHARGE INSTRUCTIONS
Follow up with Dr. Langston in 1 week - Office # [phone], can also call for any questions or concerns.       UROLOGY DISCHARGE INSTRUCTIONS    Follow your surgeons instructions.  Your urine may look pink and it may burn when you urinate.You may also feel like you have to urinate often.  Call your surgeon if your temperature is higher than 101 degrees, your urine is grossly bloody, or has large clots.  Drink plenty of fluids unless your physician advises against it.  If you can not urinate once you are home, call your surgeon or go to the Emergency Room.  If you are discharged with a catheter into your bladder follow instructions on care and removal. ( See cathter removal/ care sheet.)  Take medications as directed.Take pain medication with food.Do not drive or operate machinery while taking narcotics.  Call your surgeon for any problems or questions     ANESTHESIA DISCHARGE INSTRUCTIONS    Wear your seatbelt home.  You are under the influence of drugs-do not drink alcohol, drive, operate machinery, make any important decisions or sign any legal documents for 24 hours.  Children should not ride bikes, skate boards or play on gym sets for 24 hours after surgery.  A responsible adult needs to be with you for 24 hours.  You may experience lightheadedness, dizziness, or sleepiness following surgery.  Rest at home today- increase activity as tolerated.  Progress slowly to a regular diet unless your physician has instructed you otherwise. Drink plenty of water.  If persistent nausea and vomiting becomes a problem, call your physician.  Coughing, sore throat and muscle aches are other side effects of anesthesia, and should improve with time.  Do not drive or operate machinery while taking narcotics.  Females of childbearing potential and on hormonal birth control, should use two forms of contraception following procedure if given a medication called sugammadex and/or emend. Additional contraception should be used for 7

## 2024-08-01 NOTE — H&P
Urology Preoperative History & Physical  [facility]     Patient: Jaz Marino MRN: [id number]  Room/Bed: OR/NONE   YOB: 1945  Age/Sex: 79 y.o.female  Admission Date: 8/1/2024     Date of Service:  8/1/2024    ASSESSMENT/PLAN     Overactive bladder here for Botox injections 100 unit risk-benefit alternatives discussed the patient she understands these    Plan:  To OR for above procedure    All patient questions were answered. She understands the plan as listed above.    HISTORY     Chief Complaint: As above    History of Present Illness: Jaz Marino is a 79 y.o. female with above listed problems.  No changes in history/physical since last evaluation.  No change in symptoms.      Past Medical History:  She has a past medical history of Hypertension, Hypothyroidism, Prolonged emergence from general anesthesia, and Type II or unspecified type diabetes mellitus without mention of complication, not stated as uncontrolled.     Hospital Problem List:  Active Problems:    * No active hospital problems. *  Resolved Problems:    * No resolved hospital problems. *      Past Surgical History:  She has a past surgical history that includes Total shoulder arthroplasty; joint replacement; Cataract removal; Hysterectomy; Stimulator Surgery (N/A, 4/7/2021); Stimulator Surgery (N/A, 4/21/2021); and Stimulator Surgery (N/A, 3/2/2022).     Social History:  She reports that she quit smoking about 42 years ago. She has never used smokeless tobacco. She reports that she does not drink alcohol.     Family History:  family history includes Arthritis in her sister; Heart Attack in her mother; Stroke in her father.    Allergies:  Allergies   Allergen Reactions    Codeine Anaphylaxis    Dye [Iodides]      syncope       Medications:  Scheduled Meds:  Continuous Infusions:   sodium chloride       PRN Meds:    Review of Systems:  Pertinent positives/negatives reviewed in HPI.  All other systems reviewed and

## 2024-08-01 NOTE — OP NOTE
Urology Operative Note  [facility]     Patient: Jaz Marino MRN: [id number]  Room/Bed: OR/NONE   YOB: 1945  Age/Sex: 79 y.o.female  Admission Date: 8/1/2024     Date of Operation: 8/1/2024    Preoperative Diagnosis: Overactive bladder with urgency urinary incontinence with previous failure of medical therapy    Postoperative Diagnosis: Same    Procedure:    1. Cystourethroscopy with intradestrusor injection of botulinum toxin (UNITS = 100)    Surgeon:   Paulie Langston MD, MASON    Anesthesia: General/LMA    Indications: Pravin Marino is a 79 y.o. female presents for the above named surgery. Informed consent was obtained and the risks, benefits, and details of the procedure were explained to the patient who elected to proceed.    Details of Procedure:  The patient was brought to the operating room and placed in the supine position on the operating room table. SCDs were placed on the lower extremities. Following induction of anesthesia the patient was positioned in a supine position, all pressure points were padded, and the genitals were prepped and draped in the usual sterile fashion with administration of local anesthesia with lidocaine jelly. A routine timeout was performed, confirming the patient, procedure, site, risk of fire, patient allergies and confirming that preoperative antibiotics had been administered prior to beginning.      A 21 Kinyarwanda rigid cystoscope was passed atraumatically per urethra into the bladder.  The urethra was normal.  The bladder was then thoroughly examined.  There were no stones, tumors, diverticula, or other abnormality seen.  The bilateral ureteral orifices were in their orthotopic location and demonstrated efflux of clear yellow urine.  I then proceeded with botox injections along the posterior wall, taking great care to avoid the ureteral orifices which were identified throughout the procedure.  100 total units were injected in 1 mL increments,

## 2024-08-01 NOTE — PROGRESS NOTES
Patient/report received from Conchis Holt PACU RN, VSS, 2 L NC at all times, denying pain or needs, call light in reach, family brought to the bedside.

## 2024-08-01 NOTE — ANESTHESIA PRE PROCEDURE
Department of Anesthesiology  Preprocedure Note       Name:  Jaz Marino   Age:  79 y.o.  :  1945                                          MRN:  [id number]         Date:  2024      Surgeon: Surgeon(s):  Paulie Langston MD    Procedure: Procedure(s):  CYSTOSCOPY WITH BOTOX INJECTION (100 units)    Medications prior to admission:   Prior to Admission medications    Medication Sig Start Date End Date Taking? Authorizing Provider   glimepiride (AMARYL) 2 MG tablet Take 2 mg by mouth every morning (before breakfast)    Iron Najera MD   glimepiride (AMARYL) 1 MG tablet Take 1 mg by mouth nightly    Iron Najera MD   atorvastatin (LIPITOR) 20 MG tablet Take 20 mg by mouth daily    Iron Najera MD   levothyroxine (SYNTHROID) 125 MCG tablet Take 125 mcg by mouth Daily    Iron Najera MD   diazePAM (VALIUM) 5 MG tablet Take 5 mg by mouth every 6 hours as needed for Anxiety.    Iron Najera MD   aspirin 81 MG EC tablet Take 81 mg by mouth daily    Iron Najera MD   magnesium gluconate (MAGONATE) 500 MG tablet Take 500 mg by mouth daily    Iron Najera MD   pyridoxine (B-6) 100 MG tablet Take 50 mg by mouth daily    Iron Najera MD   VITAMIN D PO Take by mouth daily    Iron Najera MD   Omega-3 Fatty Acids (FISH OIL) 1000 MG CAPS Take 1,000 mg by mouth daily    Iron Najera MD   NONFORMULARY Omega 7 daily    Iron Najera MD   ELDERBERRY PO Take by mouth daily    Iron Najera MD   Multiple Vitamins-Minerals (THERAPEUTIC MULTIVITAMIN-MINERALS) tablet Take 1 tablet by mouth daily    Iron Najera MD   OXYGEN Inhale 2 L into the lungs nightly    Iron Najera MD   NONFORMULARY CALCIUM 400 MG DAILY    Iron Najera MD   PARoxetine (PAXIL) 20 MG tablet Take 20 mg by mouth every morning.      Iron Najera MD   loratadine (CLARITIN) 10 MG tablet Take 10 mg by mouth as needed

## 2025-04-21 ENCOUNTER — OFFICE VISIT (OUTPATIENT)
Dept: PULMONOLOGY | Age: 80
End: 2025-04-21
Payer: MEDICARE

## 2025-04-21 VITALS
OXYGEN SATURATION: 98 % | HEART RATE: 77 BPM | HEIGHT: 70 IN | SYSTOLIC BLOOD PRESSURE: 118 MMHG | WEIGHT: 232.2 LBS | BODY MASS INDEX: 33.24 KG/M2 | DIASTOLIC BLOOD PRESSURE: 70 MMHG

## 2025-04-21 DIAGNOSIS — J98.6 ELEVATED DIAPHRAGM: Primary | ICD-10-CM

## 2025-04-21 DIAGNOSIS — G47.33 OSA (OBSTRUCTIVE SLEEP APNEA): ICD-10-CM

## 2025-04-21 DIAGNOSIS — J96.11 CHRONIC RESPIRATORY FAILURE WITH HYPOXIA (HCC): ICD-10-CM

## 2025-04-21 PROCEDURE — 1090F PRES/ABSN URINE INCON ASSESS: CPT | Performed by: INTERNAL MEDICINE

## 2025-04-21 PROCEDURE — G8400 PT W/DXA NO RESULTS DOC: HCPCS | Performed by: INTERNAL MEDICINE

## 2025-04-21 PROCEDURE — 1036F TOBACCO NON-USER: CPT | Performed by: INTERNAL MEDICINE

## 2025-04-21 PROCEDURE — G8427 DOCREV CUR MEDS BY ELIG CLIN: HCPCS | Performed by: INTERNAL MEDICINE

## 2025-04-21 PROCEDURE — G8419 CALC BMI OUT NRM PARAM NOF/U: HCPCS | Performed by: INTERNAL MEDICINE

## 2025-04-21 PROCEDURE — 1159F MED LIST DOCD IN RCRD: CPT | Performed by: INTERNAL MEDICINE

## 2025-04-21 PROCEDURE — 1123F ACP DISCUSS/DSCN MKR DOCD: CPT | Performed by: INTERNAL MEDICINE

## 2025-04-21 PROCEDURE — 99204 OFFICE O/P NEW MOD 45 MIN: CPT | Performed by: INTERNAL MEDICINE

## 2025-04-21 PROCEDURE — 1160F RVW MEDS BY RX/DR IN RCRD: CPT | Performed by: INTERNAL MEDICINE

## 2025-04-21 RX ORDER — MULTIVITAMIN
TABLET ORAL
COMMUNITY

## 2025-04-21 RX ORDER — BLOOD SUGAR DIAGNOSTIC
1 STRIP MISCELLANEOUS DAILY
COMMUNITY

## 2025-04-21 RX ORDER — PRAVASTATIN SODIUM 10 MG
10 TABLET ORAL DAILY
COMMUNITY
Start: 2025-03-25 | End: 2025-06-23

## 2025-04-21 RX ORDER — ALBUTEROL SULFATE 90 UG/1
2 INHALANT RESPIRATORY (INHALATION) EVERY 6 HOURS PRN
COMMUNITY
Start: 2025-04-08 | End: 2025-07-07

## 2025-04-21 RX ORDER — MAGNESIUM OXIDE 400 MG/1
400 TABLET ORAL DAILY
COMMUNITY
Start: 2025-01-28 | End: 2025-04-28

## 2025-04-21 RX ORDER — ERGOCALCIFEROL 1.25 MG/1
50000 CAPSULE, LIQUID FILLED ORAL WEEKLY
COMMUNITY

## 2025-04-21 RX ORDER — LOSARTAN POTASSIUM 50 MG/1
50 TABLET ORAL DAILY
COMMUNITY
Start: 2025-01-02

## 2025-04-21 RX ORDER — LANCETS 33 GAUGE
EACH MISCELLANEOUS
COMMUNITY
Start: 2025-03-26

## 2025-04-21 RX ORDER — PREDNISONE 20 MG/1
20 TABLET ORAL 2 TIMES DAILY
COMMUNITY
Start: 2025-04-05

## 2025-04-21 NOTE — PROGRESS NOTES
PULMONARY OFFICE NEW PATIENT VISIT    CONSULTING PHYSICIAN:      REASON FOR VISIT:   Chief Complaint   Patient presents with    Chronic Respiratory Failure     Currently on 2 L/min POC. Stated she has a COPD exacerbation in ER at [facility] a couple weeks ago. Stated when she does any physical activity, she gets exhausted and SOB. Stated she checks her o2 level regularly and cannot go without her o2.    Elevated Diaphragm       DATE OF VISIT: 2025    HISTORY OF PRESENT ILLNESS: 80 y.o. year old female with past medical history of obstructive sleep apnea not on CPAP, chronic hypoxic respiratory failure on home oxygen, chronically elevated right hemidiaphragm, diabetes mellitus type 2, hypertension, anxiety who is here for evaluation of respiratory failure.    Patient has been following with [facility] pulmonology.  Shifting her care to us.      She has chronically elevated right hemidiaphragm which is dating back to chest x-ray from .  Her last PFTs from 2023 were suggestive of restriction with reduced diffusion capacity.    Patient wears oxygen at 2 L/min all the time.  She has been wearing oxygen for the last couple of years.  She has POC.      Patient complains of dyspnea on exertion.  She denies any chronic cough or wheezing or chest pain or hemoptysis.  Denies any leg edema.    She also have ANGELICA and was prescribed CPAP of 11 cm of water after sleep study in .  Patient states that she lost supplies of CPAP and has not been able to wear CPAP.  She complains of fatigue and daytime sleepiness.    DIAGNOSTIC TEST REVIEWED:  I reviewed the PFT from 2023 and my interpretation is as follows.  Restrictive lung disease reduced diffusion capacity  FeV1/FVC: 78  FeV1: 70%, 1.49 liters  FVC: 69%, 1.90 liters  T%, 3 liters  DLCO: 59%      REVIEW OF SYSTEMS:   CONSTITUTIONAL SYMPTOMS: The patient denies fever, fatigue, night sweats, weight loss or weight gain.   HEENT: No vision

## 2025-04-22 ENCOUNTER — TELEPHONE (OUTPATIENT)
Dept: SLEEP CENTER | Age: 80
End: 2025-04-22

## 2025-04-22 NOTE — TELEPHONE ENCOUNTER
Called to schedule a CPAP per Gadiel tsang for the pt to return my call     [facility] medicare insurance

## 2025-04-29 ENCOUNTER — HOSPITAL ENCOUNTER (OUTPATIENT)
Dept: PULMONOLOGY | Age: 80
Discharge: HOME | End: 2025-04-29
Attending: INTERNAL MEDICINE
Payer: MEDICARE

## 2025-04-29 ENCOUNTER — HOSPITAL ENCOUNTER (OUTPATIENT)
Dept: GENERAL RADIOLOGY | Age: 80
Discharge: HOME | End: 2025-04-29
Attending: INTERNAL MEDICINE
Payer: MEDICARE

## 2025-04-29 DIAGNOSIS — J98.6 ELEVATED DIAPHRAGM: ICD-10-CM

## 2025-04-29 DIAGNOSIS — J96.11 CHRONIC RESPIRATORY FAILURE WITH HYPOXIA (HCC): ICD-10-CM

## 2025-04-29 LAB
DLCO %PRED: 68 %
DLCO PRED: NORMAL
DLCO/VA %PRED: NORMAL
DLCO/VA PRED: NORMAL
DLCO/VA: NORMAL
DLCO: NORMAL
EXPIRATORY TIME-POST: NORMAL
EXPIRATORY TIME: NORMAL
FEF 25-75 %CHNG: NORMAL
FEF 25-75 POST %PRED: NORMAL
FEF 25-75% %PRED-PRE: NORMAL
FEF 25-75% PRED: NORMAL
FEF 25-75-POST: NORMAL
FEF 25-75-PRE: NORMAL
FEV1 %PRED-POST: 51 %
FEV1 %PRED-PRE: 45 %
FEV1 PRED: NORMAL
FEV1-POST: NORMAL
FEV1-PRE: NORMAL
FEV1/FVC %PRED-POST: NORMAL
FEV1/FVC %PRED-PRE: NORMAL
FEV1/FVC PRED: NORMAL
FEV1/FVC-POST: 107 %
FEV1/FVC-PRE: 106 %
FVC %PRED-POST: NORMAL
FVC %PRED-PRE: NORMAL
FVC PRED: NORMAL
FVC-POST: NORMAL
FVC-PRE: NORMAL
GAW %PRED: NORMAL
GAW PRED: NORMAL
GAW: NORMAL
IC PRE %PRED: NORMAL
IC PRED: NORMAL
IC: NORMAL
MEP: NORMAL
MIP: NORMAL
MVV %PRED-PRE: NORMAL
MVV PRED: NORMAL
MVV-PRE: NORMAL
PEF %PRED-POST: NORMAL
PEF %PRED-PRE: NORMAL
PEF PRED: NORMAL
PEF%CHNG: NORMAL
PEF-POST: NORMAL
PEF-PRE: NORMAL
RAW %PRED: NORMAL
RAW PRED: NORMAL
RAW: NORMAL
RV PRE %PRED: NORMAL
RV PRED: NORMAL
RV: NORMAL
SVC %PRED: NORMAL
SVC PRED: NORMAL
SVC: NORMAL
TLC PRE %PRED: 62 %
TLC PRED: NORMAL
TLC: NORMAL
VA %PRED: NORMAL
VA PRED: NORMAL
VA: NORMAL
VTG %PRED: NORMAL
VTG PRED: NORMAL
VTG: NORMAL

## 2025-04-29 PROCEDURE — 6370000000 HC RX 637 (ALT 250 FOR IP): Performed by: INTERNAL MEDICINE

## 2025-04-29 PROCEDURE — 94760 N-INVAS EAR/PLS OXIMETRY 1: CPT

## 2025-04-29 PROCEDURE — 94726 PLETHYSMOGRAPHY LUNG VOLUMES: CPT

## 2025-04-29 PROCEDURE — 94729 DIFFUSING CAPACITY: CPT

## 2025-04-29 PROCEDURE — 76000 FLUOROSCOPY <1 HR PHYS/QHP: CPT

## 2025-04-29 PROCEDURE — 94060 EVALUATION OF WHEEZING: CPT

## 2025-04-29 RX ORDER — ALBUTEROL SULFATE 90 UG/1
4 INHALANT RESPIRATORY (INHALATION) ONCE
Status: COMPLETED | OUTPATIENT
Start: 2025-04-29 | End: 2025-04-29

## 2025-04-29 RX ADMIN — Medication 4 PUFF: at 10:13

## 2025-04-29 ASSESSMENT — PULMONARY FUNCTION TESTS
FEV1_PERCENT_PREDICTED_PRE: 45
FEV1_PERCENT_PREDICTED_POST: 51
FEV1/FVC_PRE: 106
FEV1/FVC_POST: 107

## 2025-04-30 NOTE — PROCEDURES
Pulmonary Function Testing      Patient name:  Jaz Marino      Unit #:   [id number]   Date of test:/20 9/2025  Date of interpretation:   4/30/2025    Ms. Jaz Marino is a 80 y.o. year-old.  The spirometry data were acceptable and reproducible.     Spirometry:  Flow volume loops were restricted. The FEV-1/FVC ratio was normal . The  post-bronchodilator FEV-1 was 1.28 liters (51% of predicted) (Z score: -2.79), which was moderately decreased. The FVC was 1.62 liters (49% of predicted) (Z score: -3.31), which was decreased. Response to inhaled bronchodilators (albuterol) was not significant.    Lung volumes:  Lung volumes were tested by plethysmography. The total lung capacity was 3.70 liters (62% of predicted) (Z score: -4.20), which was decreased. The residual volume was 2.09 liters (76% of predicted), which was decreased. The ratio of residual volume to total lung capacity (RV/TLC) was 116%, which was normal.     Diffusion capacity was found to be (68% of predicted) (Z score: -1.50) which was normal.       Interpretation:  Moderate to severe restrictive ventilatory defect with an insignificant bronchodilator response and normal diffusion capacity      Sergey Alegria MD, [facility], Critical Care [facility] Sleep Medicine  [address], [address], [place] [address]      Z-scores are recommended over fixed percent predicted thresholds to classify the severity of airflow obstruction.  Using Z-scores can reduce age and height bias, and the recommended threshold correlate with mortality risk.  The recommended Z-score threshold for different severities of airflow obstruction are:    Mild: -1.645 to -2.5  Moderate: -2.5 to -4.0  Severe:> -4.0

## 2025-05-15 ENCOUNTER — TELEPHONE (OUTPATIENT)
Dept: PULMONOLOGY | Age: 80
End: 2025-05-15

## 2025-05-15 NOTE — TELEPHONE ENCOUNTER
LM for pt explaining that we usually do not go over PFT results over the phone usually but will at her follow up visit once she gets all testing done. Still awaiting her to get her PSG done as well.

## 2025-05-27 ENCOUNTER — HOSPITAL ENCOUNTER (OUTPATIENT)
Dept: SLEEP CENTER | Age: 80
Discharge: HOME | End: 2025-05-27
Payer: MEDICARE

## 2025-05-27 DIAGNOSIS — G47.33 OSA (OBSTRUCTIVE SLEEP APNEA): ICD-10-CM

## 2025-05-27 PROCEDURE — 95811 POLYSOM 6/>YRS CPAP 4/> PARM: CPT

## 2025-05-28 PROBLEM — G47.33 OSA (OBSTRUCTIVE SLEEP APNEA): Status: ACTIVE | Noted: 2025-05-28

## 2025-05-29 ENCOUNTER — TELEPHONE (OUTPATIENT)
Dept: PULMONOLOGY | Age: 80
End: 2025-05-29

## 2025-05-29 NOTE — TELEPHONE ENCOUNTER
LM for pt to return my call regarding her PSG results. Per visit note, seems she still has a CPAP from 2023 but has not been using and doesn't have supplies. I just need to know if she has a CPAP or if she just needs supplies set up at a new [employer] company.

## 2025-05-30 ENCOUNTER — TELEPHONE (OUTPATIENT)
Dept: PULMONOLOGY | Age: 80
End: 2025-05-30

## 2025-05-30 NOTE — TELEPHONE ENCOUNTER
Spoke to pt and she stated she has to turn in her CPAP she was using that she has been paying for out of pocket. She is unsure of the [employer] company who supplied it. She stated she was told by them to wait until she gets this new one we are sending to [facility] that will be billed through her insurance. I told her the order was faxed today to [facility] and gave her their number to follow up if she does not hear from them within 1 week.

## 2025-06-06 NOTE — TELEPHONE ENCOUNTER
Spoke to pt and she stated she has to turn in her CPAP she was using that she has been paying for out of pocket. She is unsure of the [employer] company who supplied it. She stated she was told by them to wait until she gets this new one we are sending to [facility] that will be billed through her insurance. I told her the order was faxed today to [facility] and gave her their number to follow up if she does not hear from them within 1 week.

## 2025-07-29 ENCOUNTER — OFFICE VISIT (OUTPATIENT)
Dept: PULMONOLOGY | Age: 80
End: 2025-07-29
Payer: MEDICARE

## 2025-07-29 VITALS
SYSTOLIC BLOOD PRESSURE: 120 MMHG | HEIGHT: 70 IN | DIASTOLIC BLOOD PRESSURE: 82 MMHG | BODY MASS INDEX: 33.39 KG/M2 | WEIGHT: 233.2 LBS | HEART RATE: 78 BPM | OXYGEN SATURATION: 97 %

## 2025-07-29 DIAGNOSIS — J98.6 ELEVATED DIAPHRAGM: ICD-10-CM

## 2025-07-29 DIAGNOSIS — J96.11 CHRONIC RESPIRATORY FAILURE WITH HYPOXIA (HCC): Primary | ICD-10-CM

## 2025-07-29 DIAGNOSIS — G47.33 OSA (OBSTRUCTIVE SLEEP APNEA): ICD-10-CM

## 2025-07-29 PROCEDURE — G8400 PT W/DXA NO RESULTS DOC: HCPCS | Performed by: INTERNAL MEDICINE

## 2025-07-29 PROCEDURE — G8417 CALC BMI ABV UP PARAM F/U: HCPCS | Performed by: INTERNAL MEDICINE

## 2025-07-29 PROCEDURE — 1160F RVW MEDS BY RX/DR IN RCRD: CPT | Performed by: INTERNAL MEDICINE

## 2025-07-29 PROCEDURE — 1036F TOBACCO NON-USER: CPT | Performed by: INTERNAL MEDICINE

## 2025-07-29 PROCEDURE — 1159F MED LIST DOCD IN RCRD: CPT | Performed by: INTERNAL MEDICINE

## 2025-07-29 PROCEDURE — G8427 DOCREV CUR MEDS BY ELIG CLIN: HCPCS | Performed by: INTERNAL MEDICINE

## 2025-07-29 PROCEDURE — 1090F PRES/ABSN URINE INCON ASSESS: CPT | Performed by: INTERNAL MEDICINE

## 2025-07-29 PROCEDURE — G2211 COMPLEX E/M VISIT ADD ON: HCPCS | Performed by: INTERNAL MEDICINE

## 2025-07-29 PROCEDURE — 1123F ACP DISCUSS/DSCN MKR DOCD: CPT | Performed by: INTERNAL MEDICINE

## 2025-07-29 PROCEDURE — 99214 OFFICE O/P EST MOD 30 MIN: CPT | Performed by: INTERNAL MEDICINE

## 2025-07-29 RX ORDER — BUDESONIDE 0.5 MG/2ML
0.5 INHALANT ORAL 2 TIMES DAILY
COMMUNITY
Start: 2025-06-17 | End: 2025-10-15

## 2025-07-29 RX ORDER — ARFORMOTEROL TARTRATE 15 UG/2ML
15 SOLUTION RESPIRATORY (INHALATION) 2 TIMES DAILY
COMMUNITY
Start: 2025-06-17 | End: 2025-10-15

## 2025-07-29 RX ORDER — IPRATROPIUM BROMIDE AND ALBUTEROL SULFATE 2.5; .5 MG/3ML; MG/3ML
3 SOLUTION RESPIRATORY (INHALATION) EVERY 6 HOURS PRN
COMMUNITY
Start: 2025-06-17

## 2025-07-29 ASSESSMENT — SLEEP AND FATIGUE QUESTIONNAIRES
HOW LIKELY ARE YOU TO NOD OFF OR FALL ASLEEP WHILE WATCHING TV: MODERATE CHANCE OF DOZING
HOW LIKELY ARE YOU TO NOD OFF OR FALL ASLEEP WHEN YOU ARE A PASSENGER IN A CAR FOR AN HOUR WITHOUT A BREAK: WOULD NEVER DOZE
ESS TOTAL SCORE: 8
HOW LIKELY ARE YOU TO NOD OFF OR FALL ASLEEP WHILE LYING DOWN TO REST IN THE AFTERNOON WHEN CIRCUMSTANCES PERMIT: HIGH CHANCE OF DOZING
HOW LIKELY ARE YOU TO NOD OFF OR FALL ASLEEP WHILE SITTING AND READING: MODERATE CHANCE OF DOZING
HOW LIKELY ARE YOU TO NOD OFF OR FALL ASLEEP WHILE SITTING AND TALKING TO SOMEONE: WOULD NEVER DOZE
HOW LIKELY ARE YOU TO NOD OFF OR FALL ASLEEP WHILE SITTING QUIETLY AFTER LUNCH WITHOUT ALCOHOL: SLIGHT CHANCE OF DOZING
HOW LIKELY ARE YOU TO NOD OFF OR FALL ASLEEP IN A CAR, WHILE STOPPED FOR A FEW MINUTES IN TRAFFIC: WOULD NEVER DOZE
HOW LIKELY ARE YOU TO NOD OFF OR FALL ASLEEP WHILE SITTING INACTIVE IN A PUBLIC PLACE: WOULD NEVER DOZE

## 2025-07-29 NOTE — PROGRESS NOTES
PULMONARY OFFICE FOLLOW UP NOTE    REASON FOR VISIT:   Chief Complaint   Patient presents with    Sleep Apnea     Was set up with TR yesterday for her supplies. Rcvd new mask and supplies.    Elevated Diaphragm       DATE OF VISIT: 2025    HISTORY OF PRESENT ILLNESS: 80 y.o. year old female is here for follow-up of ANGELICA, chronic hypoxic respiratory failure, chronically elevated right hemidiaphragm and dyspnea on exertion.  She has history of diabetes mellitus type 2, hypertension, anxiety     Patient has sniff test performed on 2025 that showed paralyzed right hemidiaphragm.  Patient has chronically elevated right hemidiaphragm which is dating back to chest x-ray from .     PFTs are suggestive of restrictive patient with reduced diffusion capacity.  Similar findings of the PFTs from 2023.    Continues to have dyspnea on exertion.  She finds dyspnea on exertion very debilitating.  Denies any cough or wheezing chest pain or hemoptysis.  Denies any leg edema.    Continues to wear oxygen at 2 L/min.    She also have ANGELICA and was prescribed CPAP of 11 cm of water after sleep study in .  Patient states that she lost supplies of CPAP and has not been able to wear CPAP.  She underwent CPAP titration study on 2025 and was prescribed auto CPAP 12 to 16 cm of water.  The settings on her old CPAP machine were just adjusted yesterday.       DIAGNOSTIC TEST REVIEWED:  I reviewed the PFT from 2023 and my interpretation is as follows.  Restrictive lung disease reduced diffusion capacity  FeV1/FVC: 78  FeV1: 70%, 1.49 liters  FVC: 69%, 1.90 liters  T%, 3 liters  DLCO: 59%      REVIEW OF SYSTEMS: 14 point ROS is negative beside mentioned in [place].   CONSTITUTIONAL SYMPTOMS: The patient denies fever, fatigue, night sweats, weight loss or weight gain.   HEENT: No vision changes. No tinnitus, Denies sinus pain. No hoarseness, or dysphagia.   NECK: Patient denies swelling in the neck.

## 2025-08-13 ENCOUNTER — HOSPITAL ENCOUNTER (OUTPATIENT)
Dept: CT IMAGING | Age: 80
Discharge: HOME | End: 2025-08-13
Attending: INTERNAL MEDICINE
Payer: MEDICARE

## 2025-08-13 DIAGNOSIS — J96.11 CHRONIC RESPIRATORY FAILURE WITH HYPOXIA (HCC): ICD-10-CM

## 2025-08-13 PROCEDURE — 71250 CT THORAX DX C-: CPT

## 2025-08-21 ENCOUNTER — OFFICE VISIT (OUTPATIENT)
Dept: PULMONOLOGY | Age: 80
End: 2025-08-21
Payer: MEDICARE

## 2025-08-21 VITALS
DIASTOLIC BLOOD PRESSURE: 80 MMHG | HEIGHT: 70 IN | WEIGHT: 230.2 LBS | OXYGEN SATURATION: 95 % | HEART RATE: 87 BPM | BODY MASS INDEX: 32.96 KG/M2 | SYSTOLIC BLOOD PRESSURE: 124 MMHG

## 2025-08-21 DIAGNOSIS — G47.33 OSA (OBSTRUCTIVE SLEEP APNEA): ICD-10-CM

## 2025-08-21 DIAGNOSIS — J96.11 CHRONIC RESPIRATORY FAILURE WITH HYPOXIA (HCC): Primary | ICD-10-CM

## 2025-08-21 DIAGNOSIS — J98.6 ELEVATED DIAPHRAGM: ICD-10-CM

## 2025-08-21 PROCEDURE — 1123F ACP DISCUSS/DSCN MKR DOCD: CPT | Performed by: INTERNAL MEDICINE

## 2025-08-21 PROCEDURE — G8417 CALC BMI ABV UP PARAM F/U: HCPCS | Performed by: INTERNAL MEDICINE

## 2025-08-21 PROCEDURE — 1159F MED LIST DOCD IN RCRD: CPT | Performed by: INTERNAL MEDICINE

## 2025-08-21 PROCEDURE — G2211 COMPLEX E/M VISIT ADD ON: HCPCS | Performed by: INTERNAL MEDICINE

## 2025-08-21 PROCEDURE — 1090F PRES/ABSN URINE INCON ASSESS: CPT | Performed by: INTERNAL MEDICINE

## 2025-08-21 PROCEDURE — 1036F TOBACCO NON-USER: CPT | Performed by: INTERNAL MEDICINE

## 2025-08-21 PROCEDURE — G8400 PT W/DXA NO RESULTS DOC: HCPCS | Performed by: INTERNAL MEDICINE

## 2025-08-21 PROCEDURE — 99214 OFFICE O/P EST MOD 30 MIN: CPT | Performed by: INTERNAL MEDICINE

## 2025-08-21 PROCEDURE — G8427 DOCREV CUR MEDS BY ELIG CLIN: HCPCS | Performed by: INTERNAL MEDICINE

## 2025-08-21 PROCEDURE — 1160F RVW MEDS BY RX/DR IN RCRD: CPT | Performed by: INTERNAL MEDICINE

## (undated) DEVICE — LOTION PREP REMV 5OZ IODO CLR TINC OF BENZ DURAPREP

## (undated) DEVICE — ELECTRODE PT RET AD L9FT HI MOIST COND ADH HYDRGEL CORDED

## (undated) DEVICE — GLOVE ORANGE PI 7   MSG9070

## (undated) DEVICE — HYPODERMIC SAFETY NEEDLE: Brand: MAGELLAN

## (undated) DEVICE — 3M™ TEGADERM™ TRANSPARENT FILM DRESSING FRAME STYLE, 1628, 6 IN X 8 IN (15 CM X 20 CM), 10/CT 8CT/CASE: Brand: 3M™ TEGADERM™

## (undated) DEVICE — NEEDLE HYPO 22GA L1.5IN BLK POLYPR HUB S STL REG BVL STR

## (undated) DEVICE — SUTURE MCRYL + SZ 4-0 L27IN ABSRB UD L19MM PS-2 3/8 CIR MCP426H

## (undated) DEVICE — SUTURE MCRYL SZ 4-0 L27IN ABSRB UD L19MM PS-2 1/2 CIR PRIM Y426H

## (undated) DEVICE — CORD,CAUTERY,BIPOLAR,STERILE: Brand: MEDLINE

## (undated) DEVICE — SOLUTION IRRIGATION STRL H2O 1000 ML UROMATIC CONTAINER

## (undated) DEVICE — MERCY FAIRFIELD TURNOVER KIT: Brand: MEDLINE INDUSTRIES, INC.

## (undated) DEVICE — MAJOR SET UP PK

## (undated) DEVICE — SOLUTION IV IRRIG WATER 1000ML POUR BRL 2F7114

## (undated) DEVICE — SUTURE VCRL SZ 3-0 L18IN ABSRB UD L26MM SH 1/2 CIR J864D

## (undated) DEVICE — SHEET, T, LAPAROTOMY, STERILE: Brand: MEDLINE

## (undated) DEVICE — SUTURE PROL SZ 2-0 L36IN NONABSORBABLE BLU SH L26MM 1/2 CIR 8523H

## (undated) DEVICE — CONTROL SYRINGE LUER-LOCK TIP: Brand: MONOJECT

## (undated) DEVICE — Device

## (undated) DEVICE — BAG DRAINAGE NS

## (undated) DEVICE — CYSTO: Brand: MEDLINE INDUSTRIES, INC.

## (undated) DEVICE — DRAPE C ARM UNIV W41XL74IN CLR PLAS XR VELC CLSR POLY STRP

## (undated) DEVICE — SYRINGE MED 10ML TRNSLUC BRL PLUNG BLK MRK POLYPR CTRL

## (undated) DEVICE — Device: Brand: INJETAK ADJUSTABLE TIP NEEDLE 70CM

## (undated) DEVICE — SYRINGE, LUER LOCK, 10ML: Brand: MEDLINE

## (undated) DEVICE — ADHESIVE SKIN CLSR 0.7ML TOP DERMBND ADV

## (undated) DEVICE — NEUROSTIMULATOR EXT SM LTWT SGL BTTN H2O RESIST WIRELESS

## (undated) DEVICE — SOLUTION IRRIG 1000ML STRL H2O USP PLAS POUR BTL

## (undated) DEVICE — SUTURE PERMAHAND SZ 2-0 L18IN NONABSORBABLE BLK L26MM FS 685G

## (undated) DEVICE — WET SKIN PREP TRAY: Brand: MEDLINE INDUSTRIES, INC.

## (undated) DEVICE — CYSTO/BLADDER IRRIGATION SET, REGULATING CLAMP

## (undated) DEVICE — 3M™ IOBAN™ 2 ANTIMICROBIAL INCISE DRAPE 6650EZ: Brand: IOBAN™ 2

## (undated) DEVICE — APPLICATOR PREP 26ML 0.7% IOD POVACRYLEX 74% ISO ALC ST

## (undated) DEVICE — SUTURE VCRL + SZ 3-0 L27IN ABSRB UD L26MM SH 1/2 CIR VCP416H

## (undated) DEVICE — INTENDED FOR TISSUE SEPARATION, AND OTHER PROCEDURES THAT REQUIRE A SHARP SURGICAL BLADE TO PUNCTURE OR CUT.: Brand: BARD-PARKER ® STAINLESS STEEL BLADES

## (undated) DEVICE — 1010 S-DRAPE TOWEL DRAPE 10/BX: Brand: STERI-DRAPE™

## (undated) DEVICE — DRAPE C-ARM 267CM X 152CM

## (undated) DEVICE — SUTURE VCRL SZ 3-0 L27IN ABSRB UD L26MM SH 1/2 CIR J416H

## (undated) DEVICE — 3M™ TEGADERM™ TRANSPARENT FILM DRESSING FRAME STYLE, 1629, 8 IN X 12 IN (20 CM X 30 CM), 10/CT 8CT/CASE: Brand: 3M™ TEGADERM™